# Patient Record
Sex: FEMALE | Race: WHITE | NOT HISPANIC OR LATINO | Employment: FULL TIME | URBAN - METROPOLITAN AREA
[De-identification: names, ages, dates, MRNs, and addresses within clinical notes are randomized per-mention and may not be internally consistent; named-entity substitution may affect disease eponyms.]

---

## 2017-01-19 ENCOUNTER — OFFICE VISIT (OUTPATIENT)
Dept: PEDIATRICS | Facility: CLINIC | Age: 47
End: 2017-01-19
Payer: COMMERCIAL

## 2017-01-19 VITALS
OXYGEN SATURATION: 100 % | HEART RATE: 63 BPM | SYSTOLIC BLOOD PRESSURE: 112 MMHG | DIASTOLIC BLOOD PRESSURE: 64 MMHG | WEIGHT: 154 LBS | TEMPERATURE: 96.9 F | BODY MASS INDEX: 24.17 KG/M2 | HEIGHT: 67 IN

## 2017-01-19 DIAGNOSIS — J45.990 EXERCISE-INDUCED ASTHMA: ICD-10-CM

## 2017-01-19 DIAGNOSIS — Z01.818 PREOP GENERAL PHYSICAL EXAM: Primary | ICD-10-CM

## 2017-01-19 DIAGNOSIS — D25.9 UTERINE LEIOMYOMA, UNSPECIFIED LOCATION: ICD-10-CM

## 2017-01-19 DIAGNOSIS — N92.1 EXCESSIVE, FREQUENT AND IRREGULAR MENSTRUATION: ICD-10-CM

## 2017-01-19 DIAGNOSIS — N84.0 ENDOMETRIAL POLYP: ICD-10-CM

## 2017-01-19 LAB
ERYTHROCYTE [DISTWIDTH] IN BLOOD BY AUTOMATED COUNT: 12.8 % (ref 10–15)
HCT VFR BLD AUTO: 39.9 % (ref 35–47)
HGB BLD-MCNC: 13.9 G/DL (ref 11.7–15.7)
MCH RBC QN AUTO: 31.1 PG (ref 26.5–33)
MCHC RBC AUTO-ENTMCNC: 34.8 G/DL (ref 31.5–36.5)
MCV RBC AUTO: 89 FL (ref 78–100)
PLATELET # BLD AUTO: 279 10E9/L (ref 150–450)
RBC # BLD AUTO: 4.47 10E12/L (ref 3.8–5.2)
WBC # BLD AUTO: 7.1 10E9/L (ref 4–11)

## 2017-01-19 PROCEDURE — 99214 OFFICE O/P EST MOD 30 MIN: CPT | Performed by: INTERNAL MEDICINE

## 2017-01-19 PROCEDURE — 85027 COMPLETE CBC AUTOMATED: CPT | Performed by: INTERNAL MEDICINE

## 2017-01-19 PROCEDURE — 36415 COLL VENOUS BLD VENIPUNCTURE: CPT | Performed by: INTERNAL MEDICINE

## 2017-01-19 NOTE — PROGRESS NOTES
[address] [address]  [phone]  Dept: [phone]    PRE-OP EVALUATION:  Today's date: 2017    Jessie Jimenes (: 1970) presents for pre-operative evaluation assessment as requested by Dr. English.  She requires evaluation and anesthesia risk assessment prior to undergoing surgery/procedure for treatment of Polyp, fibroid removal uterine ablation .  Proposed procedure: Polyp, fibroid removal uterine ablation    Date of Surgery/ Procedure: 2-3-17  Time of Surgery/ Procedure: 12:30pm  Hospital/Surgical Facility: [facility]  Primary Physician: Kasia Dias  Type of Anesthesia Anticipated: General    Patient has a Health Care Directive or Living Will:  yes    1. NO - Do you have a history of heart attack, stroke, stent, bypass or surgery on an artery in the head, neck, heart or legs?  2. NO - Do you ever have any pain or discomfort in your chest?  3. NO - Do you have a history of  Heart Failure?  4. NO - Are you troubled by shortness of breath when: walking on the level, up a slight hill or at night?  5. NO - Do you currently have a cold, bronchitis or other respiratory infection?  6. NO - Do you have a cough, shortness of breath or wheezing?  7. NO - Do you sometimes get pains in the calves of your legs when you walk?  8. NO - Do you or anyone in your family have previous history of blood clots?  9. NO - Do you or does anyone in your family have a serious bleeding problem such as prolonged bleeding following surgeries or cuts?  10. NO - Have you ever had problems with anemia or been told to take iron pills?  11. NO - Have you had any abnormal blood loss such as black, tarry or bloody stools, or abnormal vaginal bleeding?  12. NO - Have you ever had a blood transfusion?  13.YES- Have you or any of your relatives ever had problems with anesthesia? Nausea, vomiting. Typically needs scopolamine.   14. NO - Do you have sleep apnea, excessive  snoring or daytime drowsiness?  15. NO - Do you have any prosthetic heart valves?  16. NO - Do you have prosthetic joints?  17. NO - Is there any chance that you may be pregnant?      HPI:                                                      Brief HPI related to upcoming procedure: endometrial polyp removal, uterine fibroid removal and endometrial ablation.       See problem list for active medical problems.  Problems all longstanding and stable, except as noted/documented.  See ROS for pertinent symptoms related to these conditions.                                                                                                  .    MEDICAL HISTORY:                                                      Patient Active Problem List    Diagnosis Date Noted     Excessive, frequent and irregular menstruation 12/07/2016     Priority: Medium     Submucous leiomyoma of uterus 12/07/2016     Priority: Medium     Endometrial polyp 12/07/2016     Priority: Medium     Dense breast 05/20/2015     Annual mammogram       Tear of meniscus of knee 03/12/2015     Other postprocedural status(V45.89) 03/12/2015     Knee pain 12/19/2014     Pain in joint, pelvic region and thigh 12/19/2014     Pain of left calf 12/19/2014     Exercise-induced asthma 01/16/2014     Consultation for sterilization 12/18/2013     Contraception management 10/14/2013     ASCUS with positive high risk HPV 10/31/2012     10/22/12 pap- ASCUS neg HR HPV (70). Plan-- colposcopy within 3 months  12/19/12 colposcopy. No bx taken. Plan-- Repeat Pap in 1 year.  3/21/14 pap LSIL/neg HR HPV. Plan: repeat cotesting in 1 year. (due 3/21/15)   5/20/15 pap NIL/neg HPV. Plan: repeat cotesting in 3 years. Due 5/20/16.          TMJ (temporomandibular joint syndrome) 10/24/2012     CARDIOVASCULAR SCREENING; LDL GOAL LESS THAN 160 01/18/2011      Past Medical History   Diagnosis Date     ASCUS favoring benign 10/22/12     neg HR HPV (70)     H/O colposcopy with cervical biopsy  12/19/12     no bx taken.      IUD (intrauterine device) in place 10/24/2012     Exercise-induced asthma 1/16/2014     LSIL (low grade squamous intraepithelial lesion) on Pap smear 3/21/14     neg HPV     PONV (postoperative nausea and vomiting)      Past Surgical History   Procedure Laterality Date     Hernia repair  2/5/07     C hand/finger surgery unlisted       Colposcopy cervix, biopsy cervix, endocervical curettage, combined       Hysteroscopic placement contraceptive device  2/7/2014     Procedure: HYSTEROSCOPIC TUBAL LIGATION;  Hysteroscopic tubal ligation with Essure;  Surgeon: Marcos Sierra MD;  Location:  OR     Arthroscopy knee Left 3/3/2015     Procedure: ARTHROSCOPY KNEE;  Surgeon: Marcos Shen MD;  Location: [facility]     Current Outpatient Prescriptions   Medication Sig Dispense Refill     Multiple Vitamin (MULTIVITAMIN) per tablet Take 1 tablet by mouth daily.       PARoxetine (PAXIL) 10 MG tablet 1 tablet daily for 10 days each months before menses 30 tablet 3     albuterol (PROAIR HFA, PROVENTIL HFA, VENTOLIN HFA) 108 (90 BASE) MCG/ACT inhaler Inhale 2 puffs into the lungs every 6 hours as needed for shortness of breath / dyspnea 1 Inhaler 5     Probiotic Product (PROBIOTIC DAILY PO) Take by mouth daily       Calcium Carbonate-Vitamin D (CALCIUM + D PO) Take by mouth daily       acetaminophen (TYLENOL) 325 MG tablet Take 1-2 tablets (325-650 mg) by mouth every 4 hours as needed for mild pain 100 tablet 0     fish oil-omega-3 fatty acids (FISH OIL) 1000 MG capsule Take 2 g by mouth daily.       ibuprofen (ADVIL,MOTRIN) 200 MG tablet Take 800 mg by mouth four times a week.       OTC products: None, except as noted above    No Known Allergies   Latex Allergy: NO    Social History   Substance Use Topics     Smoking status: Former Smoker -- 0.50 packs/day     Types: Cigarettes     Quit date: 01/18/1998     Smokeless tobacco: Never Used     Alcohol Use: Yes      Comment: wine 6-7  "glasses  per week     History   Drug Use No       REVIEW OF SYSTEMS:                                                    C: NEGATIVE for fever, chills, change in weight  E/M: NEGATIVE for ear, mouth and throat problems  R: NEGATIVE for significant cough or SOB  CV: NEGATIVE for chest pain, palpitations or peripheral edema  GI: NEGATIVE for nausea, abdominal pain, heartburn, or change in bowel habits  MUSCULOSKELETAL: NEGATIVE for significant arthralgias or myalgia  : heavy menses.     EXAM:                                                    /64 mmHg  Pulse 63  Temp(Src) 96.9  F (36.1  C) (Temporal)  Ht 5' 6.75\" (1.695 m)  Wt 154 lb (69.854 kg)  BMI 24.31 kg/m2  SpO2 100%  GENERAL APPEARANCE: healthy, alert and no distress  HENT: ear canals and TM's normal and nose and mouth without ulcers or lesions  RESP: lungs clear to auscultation - no rales, rhonchi or wheezes  CV: regular rate and rhythm, normal S1 S2, no S3 or S4 and no murmur, click or rub   ABDOMEN: soft, nontender, no HSM or masses and bowel sounds normal  NEURO: Normal strength and tone, sensory exam grossly normal, mentation intact and speech normal    DIAGNOSTICS:                                                    EKG: appears normal, NSR, unchanged from previous tracings    Recent Labs   Lab Test  10/19/16   0520  01/28/16   1244  02/26/15   1436   HGB   --   14.2  13.5   PLT   --   244  284   NA   --   141   --    POTASSIUM   --   3.9   --    CR  64  0.81   --         IMPRESSION:                                                    Reason for surgery/procedure: endometrial polyp, uterine fibroid.   Diagnosis/reason for consult:     The proposed surgical procedure is considered LOW risk.    REVISED CARDIAC RISK INDEX  The patient has the following serious cardiovascular risks for perioperative complications such as (MI, PE, VFib and 3  AV Block):  No serious cardiac risks  INTERPRETATION: 0 risks: Class I (very low risk - 0.4% complication " rate)    The patient has the following additional risks for perioperative complications:  No identified additional risks      RECOMMENDATIONS:                                                        APPROVAL GIVEN to proceed with proposed procedure, without further diagnostic evaluation       Signed Electronically by: Kasia Dias MD    Copy of this evaluation report is provided to requesting physician.    [facility] Preop Guidelines

## 2017-01-19 NOTE — PATIENT INSTRUCTIONS
Get labs done today.     Before Your Surgery      Call your surgeon if there is any change in your health. This includes signs of a cold or flu (such as a sore throat, runny nose, cough, rash or fever).    Do not smoke, drink alcohol or take over the counter medicine (unless your surgeon or primary care doctor tells you to) for the 24 hours before and after surgery.    If you take prescribed drugs: Follow your doctor s orders about which medicines to take and which to stop until after surgery.    Eating and drinking prior to surgery: follow the instructions from your surgeon    Take a shower or bath the night before surgery. Use the soap your surgeon gave you to gently clean your skin. If you do not have soap from your surgeon, use your regular soap. Do not shave or scrub the surgery site.  Wear clean pajamas and have clean sheets on your bed.

## 2017-01-19 NOTE — NURSING NOTE
"Chief Complaint   Patient presents with     Pre-Op Exam       Initial /64 mmHg  Pulse 63  Temp(Src) 96.9  F (36.1  C) (Temporal)  Ht 5' 6.75\" (1.695 m)  Wt 154 lb (69.854 kg)  BMI 24.31 kg/m2  SpO2 100% Estimated body mass index is 24.31 kg/(m^2) as calculated from the following:    Height as of this encounter: 5' 6.75\" (1.695 m).    Weight as of this encounter: 154 lb (69.854 kg).  BP completed using cuff size: regular  .,kn      "

## 2017-01-19 NOTE — MR AVS SNAPSHOT
After Visit Summary   1/19/2017    Jessie Jimenes    MRN: [id number]           Patient Information     Date Of Birth          1970        Visit Information        Provider Department      1/19/2017 4:40 PM Kasia Dias MD [facility]        Today's Diagnoses     Preop general physical exam    -  1     Endometrial polyp         Uterine leiomyoma, unspecified location         Excessive, frequent and irregular menstruation         Exercise-induced asthma           Care Instructions    Get labs done today.     Before Your Surgery      Call your surgeon if there is any change in your health. This includes signs of a cold or flu (such as a sore throat, runny nose, cough, rash or fever).    Do not smoke, drink alcohol or take over the counter medicine (unless your surgeon or primary care doctor tells you to) for the 24 hours before and after surgery.    If you take prescribed drugs: Follow your doctor s orders about which medicines to take and which to stop until after surgery.    Eating and drinking prior to surgery: follow the instructions from your surgeon    Take a shower or bath the night before surgery. Use the soap your surgeon gave you to gently clean your skin. If you do not have soap from your surgeon, use your regular soap. Do not shave or scrub the surgery site.  Wear clean pajamas and have clean sheets on your bed.         Follow-ups after your visit        Your next 10 appointments already scheduled     Feb 03, 2017   Procedure with Marcos Sierra MD   [facility] (--)    [address] [place] [facility] [address] [phone]              Who to contact     If you have questions or need follow up information about today's clinic visit or your schedule please contact [facility] directly at [phone].  Normal or non-critical lab and imaging results will be communicated to you by MyChart, letter or phone within 4 business days after  "the clinic has received the results. If you do not hear from us within 7 days, please contact the clinic through [facility] or phone. If you have a critical or abnormal lab result, we will notify you by phone as soon as possible.  Submit refill requests through [facility] or call your pharmacy and they will forward the refill request to us. Please allow 3 business days for your refill to be completed.          Additional Information About Your Visit        [facility] Information     [facility] gives you secure access to your electronic health record. If you see a primary care provider, you can also send messages to your care team and make appointments. If you have questions, please call your primary care clinic.  If you do not have a primary care provider, please call [phone] and they will assist you.      [facility] is an electronic gateway that provides easy, online access to your medical records. With [facility], you can request a clinic appointment, read your test results, renew a prescription or communicate with your care team.     To access your existing account, please contact your [facility] or call [phone] for assistance.        Care EveryWhere ID     This is your Care EveryWhere ID. This could be used by other organizations to access your [facility] medical records  [id number]        Your Vitals Were     Pulse Temperature Height BMI (Body Mass Index) Pulse Oximetry       63 96.9  F (36.1  C) (Temporal) 5' 6.75\" (1.695 m) 24.31 kg/m2 100%        Blood Pressure from Last 3 Encounters:   01/19/17 112/64   12/07/16 124/78   09/15/16 110/66    Weight from Last 3 Encounters:   01/19/17 154 lb (69.854 kg)   12/07/16 158 lb 3.2 oz (71.759 kg)   09/15/16 153 lb (69.4 kg)              We Performed the Following     CBC with platelets        Primary Care Provider Office Phone # Fax #    Kasia Dias [phone]       [facility] [address]  [facility] [address]      "   Thank you!     Thank you for choosing [facility]  for your care. Our goal is always to provide you with excellent care. Hearing back from our patients is one way we can continue to improve our services. Please take a few minutes to complete the written survey that you may receive in the mail after your visit with us. Thank you!             Your Updated Medication List - Protect others around you: Learn how to safely use, store and throw away your medicines at www.disposemymeds.org.          This list is accurate as of: 1/19/17  4:57 PM.  Always use your most recent med list.                   Brand Name Dispense Instructions for use    acetaminophen 325 MG tablet    TYLENOL    100 tablet    Take 1-2 tablets (325-650 mg) by mouth every 4 hours as needed for mild pain       albuterol 108 (90 BASE) MCG/ACT Inhaler    PROAIR HFA/PROVENTIL HFA/VENTOLIN HFA    1 Inhaler    Inhale 2 puffs into the lungs every 6 hours as needed for shortness of breath / dyspnea       CALCIUM + D PO      Take by mouth daily       fish oil-omega-3 fatty acids 1000 MG capsule      Take 2 g by mouth daily.       ibuprofen 200 MG tablet    ADVIL/MOTRIN     Take 800 mg by mouth four times a week.       multivitamin per tablet      Take 1 tablet by mouth daily.       PARoxetine 10 MG tablet    PAXIL    30 tablet    1 tablet daily for 10 days each months before menses       PROBIOTIC DAILY PO      Take by mouth daily

## 2017-01-19 NOTE — PROGRESS NOTES
Quick Note:    Dear Jessie,   Here are your recent results which are within the expected range. Please continue with your current plan of care.   Please call or Mychart to our office if you have further questions.     Kasia Dias MD  ______

## 2017-02-03 ENCOUNTER — ANESTHESIA (OUTPATIENT)
Dept: SURGERY | Facility: AMBULATORY SURGERY CENTER | Age: 47
End: 2017-02-03
Payer: COMMERCIAL

## 2017-02-03 ENCOUNTER — ANESTHESIA EVENT (OUTPATIENT)
Dept: SURGERY | Facility: AMBULATORY SURGERY CENTER | Age: 47
End: 2017-02-03
Payer: COMMERCIAL

## 2017-02-03 ENCOUNTER — RADIANT APPOINTMENT (OUTPATIENT)
Dept: MAMMOGRAPHY | Facility: CLINIC | Age: 47
End: 2017-02-03
Attending: INTERNAL MEDICINE
Payer: COMMERCIAL

## 2017-02-03 DIAGNOSIS — Z12.31 VISIT FOR SCREENING MAMMOGRAM: ICD-10-CM

## 2017-02-03 PROCEDURE — G0202 SCR MAMMO BI INCL CAD: HCPCS | Performed by: RADIOLOGY

## 2017-02-03 PROCEDURE — 77063 BREAST TOMOSYNTHESIS BI: CPT | Performed by: RADIOLOGY

## 2017-02-03 RX ORDER — PROPOFOL 10 MG/ML
INJECTION, EMULSION INTRAVENOUS CONTINUOUS PRN
Status: DISCONTINUED | OUTPATIENT
Start: 2017-02-03 | End: 2017-02-03

## 2017-02-03 RX ORDER — ONDANSETRON 2 MG/ML
INJECTION INTRAMUSCULAR; INTRAVENOUS PRN
Status: DISCONTINUED | OUTPATIENT
Start: 2017-02-03 | End: 2017-02-03

## 2017-02-03 RX ORDER — LIDOCAINE HYDROCHLORIDE 20 MG/ML
INJECTION, SOLUTION INFILTRATION; PERINEURAL PRN
Status: DISCONTINUED | OUTPATIENT
Start: 2017-02-03 | End: 2017-02-03

## 2017-02-03 RX ORDER — DEXAMETHASONE SODIUM PHOSPHATE 4 MG/ML
INJECTION, SOLUTION INTRA-ARTICULAR; INTRALESIONAL; INTRAMUSCULAR; INTRAVENOUS; SOFT TISSUE PRN
Status: DISCONTINUED | OUTPATIENT
Start: 2017-02-03 | End: 2017-02-03

## 2017-02-03 RX ORDER — PROPOFOL 10 MG/ML
INJECTION, EMULSION INTRAVENOUS PRN
Status: DISCONTINUED | OUTPATIENT
Start: 2017-02-03 | End: 2017-02-03

## 2017-02-03 RX ADMIN — SODIUM CHLORIDE, SODIUM LACTATE, POTASSIUM CHLORIDE, CALCIUM CHLORIDE: 600; 310; 30; 20 INJECTION, SOLUTION INTRAVENOUS at 12:04

## 2017-02-03 RX ADMIN — PROPOFOL 50 MG: 10 INJECTION, EMULSION INTRAVENOUS at 12:26

## 2017-02-03 RX ADMIN — ONDANSETRON 4 MG: 2 INJECTION INTRAMUSCULAR; INTRAVENOUS at 12:37

## 2017-02-03 RX ADMIN — PROPOFOL 200 MCG/KG/MIN: 10 INJECTION, EMULSION INTRAVENOUS at 12:25

## 2017-02-03 RX ADMIN — PROPOFOL 30 MG: 10 INJECTION, EMULSION INTRAVENOUS at 12:28

## 2017-02-03 RX ADMIN — KETOROLAC TROMETHAMINE 30 MG: 30 INJECTION, SOLUTION INTRAMUSCULAR; INTRAVENOUS at 12:37

## 2017-02-03 RX ADMIN — PROPOFOL 20 MG: 10 INJECTION, EMULSION INTRAVENOUS at 12:30

## 2017-02-03 RX ADMIN — LIDOCAINE HYDROCHLORIDE 40 MG: 20 INJECTION, SOLUTION INFILTRATION; PERINEURAL at 12:26

## 2017-02-03 RX ADMIN — DEXAMETHASONE SODIUM PHOSPHATE 8 MG: 4 INJECTION, SOLUTION INTRA-ARTICULAR; INTRALESIONAL; INTRAMUSCULAR; INTRAVENOUS; SOFT TISSUE at 12:29

## 2017-02-03 NOTE — ANESTHESIA CARE TRANSFER NOTE
Patient: Jessie Candelario    Procedure(s):  Diagnostic hysteroscopy with Myosure, possible polypectomy, possible myomectomy, endometrial ablation with Novasure - Wound Class: II-Clean Contaminated   - Wound Class: II-Clean Contaminated    Diagnosis: excessive, frequent, irregular menstruation, submucous leiomyoma of uterus, endometrial polyp  Diagnosis Additional Information: No value filed.    Anesthesia Type:   MAC     Note:  Airway :Room Air  Patient transferred to:Phase II  Comments: Patient awake, alert and oriented.  Moderate cramping reported, otherwise, no discomfort.  No apparent anesthesia complications.  Requested RN to place warm pack to right antecubital.       Vitals: (Last set prior to Anesthesia Care Transfer)    CRNA VITALS  2/3/2017 1236 - 2/3/2017 1312      2/3/2017             Pulse: 60    SpO2: 100 %                Electronically Signed By: BASSEM Manuel CRNA  February 3, 2017  1:12 PM

## 2017-02-03 NOTE — ANESTHESIA POSTPROCEDURE EVALUATION
Patient: Jessie Candelario    Procedure(s):  Diagnostic hysteroscopy with Myosure and polypectomy, myomectomy, endometrial ablation with Novasure - Wound Class: II-Clean Contaminated   - Wound Class: II-Clean Contaminated    Diagnosis:excessive, frequent, irregular menstruation, submucous leiomyoma of uterus, endometrial polyp  Diagnosis Additional Information: No value filed.    Anesthesia Type:  MAC    Note:  Anesthesia Post Evaluation    Patient location during evaluation: PACU  Patient participation: Able to fully participate in evaluation  Level of consciousness: awake  Pain management: adequate  Airway patency: patent  Cardiovascular status: acceptable  Respiratory status: acceptable  Hydration status: balanced  PONV: none     Anesthetic complications: None          Last vitals:  Filed Vitals:    02/03/17 1146 02/03/17 1306 02/03/17 1320   BP: 140/60 127/65 126/73   Temp: 98.9  F (37.2  C)     Resp: 16 16 16   SpO2: 100% 100% 100%         Electronically Signed By: Parrish Newell MD  February 3, 2017  1:31 PM

## 2017-02-03 NOTE — ANESTHESIA PREPROCEDURE EVALUATION
Anesthesia Evaluation     .        ROS/MED HX    ENT/Pulmonary:  - neg pulmonary ROS     Neurologic:       Cardiovascular:  - neg cardiovascular ROS       METS/Exercise Tolerance:     Hematologic:         Musculoskeletal:         GI/Hepatic:         Renal/Genitourinary:         Endo:         Psychiatric:         Infectious Disease:         Malignancy:         Other:               Physical Exam  Normal systems: pulmonary and dental    Airway   Mallampati: II  TM distance: >3 FB  Neck ROM: full    Dental     Cardiovascular   Rhythm and rate: regular and normal      Pulmonary                     Anesthesia Plan      History & Physical Review  History and physical reviewed and following examination; no interval change.    ASA Status:  2 .    NPO Status:  > 8 hours    Plan for MAC          Postoperative Care      Consents                          .

## 2017-02-14 ENCOUNTER — MYC MEDICAL ADVICE (OUTPATIENT)
Dept: OBGYN | Facility: CLINIC | Age: 47
End: 2017-02-14

## 2017-02-15 NOTE — TELEPHONE ENCOUNTER
Noted pt had surgery on 02-03-17 by Dr. Sierra   Diagnostic Hysteroscopy with Myosure and Fibroidectomy and endometrial ablation with Novasure    Reviewed hysteroscopy and endo ablation handouts from [facility].   Vani Monroy RN, [facility]

## 2017-10-02 ENCOUNTER — MYC MEDICAL ADVICE (OUTPATIENT)
Dept: PEDIATRICS | Facility: CLINIC | Age: 47
End: 2017-10-02

## 2017-10-02 NOTE — TELEPHONE ENCOUNTER
Called patient, gave message per Dr. Dias,  scheduled with Dr. Dias on 10/5/17.    Gabrielle Butler RN, [facility]

## 2017-10-02 NOTE — TELEPHONE ENCOUNTER
Routed [employer] message to PCP    I was due to have a year follow up chest CT in 2/2017 from the 2/2016 CT that showed a small pulmonary nodule, and I'd like to start a sleeping medication, not ambien since I've started working night shifts. Do I need to make an appt for these orders?    Valeri Maddox RN,   [facility]

## 2017-10-05 ENCOUNTER — OFFICE VISIT (OUTPATIENT)
Dept: PEDIATRICS | Facility: CLINIC | Age: 47
End: 2017-10-05
Payer: COMMERCIAL

## 2017-10-05 VITALS
DIASTOLIC BLOOD PRESSURE: 70 MMHG | BODY MASS INDEX: 24.16 KG/M2 | TEMPERATURE: 97.4 F | SYSTOLIC BLOOD PRESSURE: 100 MMHG | HEART RATE: 53 BPM | OXYGEN SATURATION: 100 % | WEIGHT: 153 LBS

## 2017-10-05 DIAGNOSIS — R91.1 PULMONARY NODULE: Primary | ICD-10-CM

## 2017-10-05 DIAGNOSIS — G47.09 OTHER INSOMNIA: ICD-10-CM

## 2017-10-05 PROCEDURE — 99213 OFFICE O/P EST LOW 20 MIN: CPT | Performed by: INTERNAL MEDICINE

## 2017-10-05 RX ORDER — ZOLPIDEM TARTRATE 5 MG/1
5 TABLET ORAL
Qty: 30 TABLET | Refills: 1 | Status: SHIPPED | OUTPATIENT
Start: 2017-10-05 | End: 2019-02-21

## 2017-10-05 NOTE — NURSING NOTE
"Chief Complaint   Patient presents with     Sleep Problem     Having trouble sleeping, work shift has changed and unable to sleep past 4 hours.         Initial /70  Pulse 53  Temp 97.4  F (36.3  C) (Temporal)  Wt 153 lb (69.4 kg)  SpO2 100%  BMI 24.16 kg/m2 Estimated body mass index is 24.16 kg/(m^2) as calculated from the following:    Height as of 1/30/17: 5' 6.73\" (1.695 m).    Weight as of this encounter: 153 lb (69.4 kg).  Medication Reconciliation: complete    "

## 2017-10-05 NOTE — MR AVS SNAPSHOT
After Visit Summary   10/5/2017    Jessie Candelario    MRN: [id number]           Patient Information     Date Of Birth          1970        Visit Information        Provider Department      10/5/2017 5:10 PM Kasia Dias MD PhD [facility]        Today's Diagnoses     Pulmonary nodule    -  1    Other insomnia          Care Instructions    Make appointment(s) for:   -- chest CT: call [phone]            Follow-ups after your visit        Future tests that were ordered for you today     Open Future Orders        Priority Expected Expires Ordered    CT Chest Low Dose Non Contrast Routine  10/5/2018 10/5/2017            Who to contact     If you have questions or need follow up information about today's clinic visit or your schedule please contact [facility] directly at [phone].  Normal or non-critical lab and imaging results will be communicated to you by PayrollHerohart, letter or phone within 4 business days after the clinic has received the results. If you do not hear from us within 7 days, please contact the clinic through PayrollHerohart or phone. If you have a critical or abnormal lab result, we will notify you by phone as soon as possible.  Submit refill requests through [employer]ber or call your pharmacy and they will forward the refill request to us. Please allow 3 business days for your refill to be completed.          Additional Information About Your Visit        PayrollHerohart Information     EcoTimber gives you secure access to your electronic health record. If you see a primary care provider, you can also send messages to your care team and make appointments. If you have questions, please call your primary care clinic.  If you do not have a primary care provider, please call [phone] and they will assist you.      EcoTimber is an electronic gateway that provides easy, online access to your medical records. With [employer]ber, you can request a clinic appointment, read your  test results, renew a prescription or communicate with your care team.     To access your existing account, please contact your [facility] or call [phone] for assistance.        Care EveryWhere ID     This is your Care EveryWhere ID. This could be used by other organizations to access your [facility] medical records  [id number]        Your Vitals Were     Pulse Temperature Pulse Oximetry BMI (Body Mass Index)          53 97.4  F (36.3  C) (Temporal) 100% 24.16 kg/m2         Blood Pressure from Last 3 Encounters:   10/05/17 100/70   02/03/17 131/67   01/19/17 112/64    Weight from Last 3 Encounters:   10/05/17 153 lb (69.4 kg)   01/30/17 153 lb 15.9 oz (69.8 kg)   01/19/17 154 lb (69.9 kg)              We Performed the Following     Asthma Action Plan (AAP)          Today's Medication Changes          These changes are accurate as of: 10/5/17  5:41 PM.  If you have any questions, ask your nurse or doctor.               Start taking these medicines.        Dose/Directions    zolpidem 5 MG tablet   Commonly known as:  AMBIEN   Used for:  Other insomnia   Started by:  Kasia Dias MD PhD        Dose:  5 mg   Take 1 tablet (5 mg) by mouth nightly as needed for sleep   Quantity:  30 tablet   Refills:  1            Where to get your medicines      Some of these will need a paper prescription and others can be bought over the counter.  Ask your nurse if you have questions.     Bring a paper prescription for each of these medications     zolpidem 5 MG tablet                Primary Care Provider Office Phone # Fax #    Kasia Dias MD PhD [phone]       [address]  [facility] [address]        Equal Access to Services     CLEVE LYNN : Hadsonny Ortez, watinada lukash, qaybta kaalami barr. So [facility] [phone].    ATENCIÓN: Si habla español, tiene a lim disposición servicios gratuitos de asistencia lingüística.  Tavo lópez [phone].    We comply with applicable federal civil rights laws and [place] laws. We do not discriminate on the basis of race, color, national origin, age, disability, sex, sexual orientation, or gender identity.            Thank you!     Thank you for choosing [facility]  for your care. Our goal is always to provide you with excellent care. Hearing back from our patients is one way we can continue to improve our services. Please take a few minutes to complete the written survey that you may receive in the mail after your visit with us. Thank you!             Your Updated Medication List - Protect others around you: Learn how to safely use, store and throw away your medicines at www.disposemymeds.org.          This list is accurate as of: 10/5/17  5:41 PM.  Always use your most recent med list.                   Brand Name Dispense Instructions for use Diagnosis    acetaminophen 325 MG tablet    TYLENOL    100 tablet    Take 1-2 tablets (325-650 mg) by mouth every 4 hours as needed for mild pain    Knee pain, left       CALCIUM + D PO      Take by mouth daily        ibuprofen 200 MG tablet    ADVIL/MOTRIN     Take 800 mg by mouth four times a week.        UNABLE TO FIND      MEDICATION NAME: Vitalreds        VENTOLIN  (90 BASE) MCG/ACT Inhaler   Generic drug:  albuterol     18 Inhaler    INHALE 2 PUFFS INTO THE LUNGS EVERY 6 HOURS AS NEEDED FOR SHORTNESS OF BREATH / DYSPNEA    Exercise-induced asthma       zolpidem 5 MG tablet    AMBIEN    30 tablet    Take 1 tablet (5 mg) by mouth nightly as needed for sleep    Other insomnia

## 2017-10-05 NOTE — PROGRESS NOTES
SUBJECTIVE:   Jessie Candelario is a 47 year old female who presents to clinic today for the following health issues:      Having trouble sleeping, work shift has changed and unable to sleep past 4 hours.    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 Has trouble sleeping. In the past, ambien has helped. She is hoping to be able to get to day shift soon. Needs some ambien to help her transition. No side effects in the past.     History of nodules on CT, was to be rechecked in the spring but has not done so. Denies any symptoms. Exercise induced asthma, stable.       Current Outpatient Prescriptions on File Prior to Visit:  VENTOLIN  (90 BASE) MCG/ACT Inhaler INHALE 2 PUFFS INTO THE LUNGS EVERY 6 HOURS AS NEEDED FOR SHORTNESS OF BREATH / DYSPNEA   Calcium Carbonate-Vitamin D (CALCIUM + D PO) Take by mouth daily   acetaminophen (TYLENOL) 325 MG tablet Take 1-2 tablets (325-650 mg) by mouth every 4 hours as needed for mild pain   ibuprofen (ADVIL,MOTRIN) 200 MG tablet Take 800 mg by mouth four times a week.     No current facility-administered medications on file prior to visit.       Problem list, Medication list, Allergies, and Medical/Social/Surgical histories reviewed in [employer] and updated as appropriate.    OBJECTIVE:                                                    /70  Pulse 53  Temp 97.4  F (36.3  C) (Temporal)  Wt 153 lb (69.4 kg)  SpO2 100%  BMI 24.16 kg/m2    GEN: healthy, alert and no distress       ASSESSMENT/PLAN:                                                      47 year old female with the following diagnoses and treatment plan:      ICD-10-CM    1. Pulmonary nodule R91.1 CT Chest Low Dose Non Contrast   2. Other insomnia G47.09 zolpidem (AMBIEN) 5 MG tablet       -- CT ordered and ambien given.     Will call or return to clinic if worsening or symptoms not improving as discussed.  See Patient Instructions.        Kasia Dias  MD-PhD  [facility]    (Note: Chart documentation was done in part with Dragon Voice Recognition software. Although reviewed after completion, some word and grammatical errors may remain.)

## 2017-10-05 NOTE — LETTER
My Asthma Action Plan  Name: Jessie Candelario   YOB: 1970  Date: 10/5/2017   My doctor: Kasia Dias MD PhD   My clinic: [facility]        My Control Medicine: { :318107}  My Rescue Medicine: { :298823}  {AAP include Oral Steroid:503703} My Asthma Severity: { :454160}  Avoid your asthma triggers: { :369612}        {Is patient a child or adult?:221879}       GREEN ZONE   Good Control    I feel good    No cough or wheeze    Can work, sleep and play without asthma symptoms       Take your asthma control medicine every day.     1. If exercise triggers your asthma, take your rescue medication    15 minutes before exercise or sports, and    During exercise if you have asthma symptoms  2. Spacer to use with inhaler: If you have a spacer, make sure to use it with your inhaler             YELLOW ZONE Getting Worse  I have ANY of these:    I do not feel good    Cough or wheeze    Chest feels tight    Wake up at night   1. Keep taking your Green Zone medications  2. Start taking your rescue medicine:    every 20 minutes for up to 1 hour. Then every 4 hours for 24-48 hours.  3. If you stay in the Yellow Zone for more than 12-24 hours, contact your doctor.  4. If you do not return to the Green Zone in 12-24 hours or you get worse, start taking your oral steroid medicine if prescribed by your provider.           RED ZONE Medical Alert - Get Help  I have ANY of these:    I feel awful    Medicine is not helping    Breathing getting harder    Trouble walking or talking    Nose opens wide to breathe       1. Take your rescue medicine NOW  2. If your provider has prescribed an oral steroid medicine, start taking it NOW  3. Call your doctor NOW  4. If you are still in the Red Zone after 20 minutes and you have not reached your doctor:    Take your rescue medicine again and    Call 911 or go to the emergency room right away    See your regular doctor within 2 weeks of an Emergency Room or Urgent Care  visit for follow-up treatment.        Electronically signed by: Sabrina Dela Cruz, October 5, 2017    Annual Reminders:  Meet with Asthma Educator,  Flu Shot in the Fall, consider Pneumonia Vaccination for patients with asthma (aged 19 and older).    Pharmacy:    [id number] - [place], [place] - [address]  [facility] [place] [address], [address]                    Asthma Triggers  How To Control Things That Make Your Asthma Worse    Triggers are things that make your asthma worse.  Look at the list below to help you find your triggers and what you can do about them.  You can help prevent asthma flare-ups by staying away from your triggers.      Trigger                                                          What you can do   Cigarette Smoke  Tobacco smoke can make asthma worse. Do not allow smoking in your home, car or around you.  Be sure no one smokes at a child s day care or school.  If you smoke, ask your health care provider for ways to help you quit.  Ask family members to quit too.  Ask your health care provider for a referral to Quit Plan to help you quit smoking, or call 8-003-756-PLAN.     Colds, Flu, Bronchitis  These are common triggers of asthma. Wash your hands often.  Don t touch your eyes, nose or mouth.  Get a flu shot every year.     Dust Mites  These are tiny bugs that live in cloth or carpet. They are too small to see. Wash sheets and blankets in hot water every week.   Encase pillows and mattress in dust mite proof covers.  Avoid having carpet if you can. If you have carpet, vacuum weekly.   Use a dust mask and HEPA vacuum.   Pollen and Outdoor Mold  Some people are allergic to trees, grass, or weed pollen, or molds. Try to keep your windows closed.  Limit time out doors when pollen count is high.   Ask you health care provider about taking medicine during allergy season.     Animal Dander  Some people are allergic to skin flakes, urine  or saliva from pets with fur or feathers. Keep pets with fur or feathers out of your home.    If you can t keep the pet outdoors, then keep the pet out of your bedroom.  Keep the bedroom door closed.  Keep pets off cloth furniture and away from stuffed toys.     Mice, Rats, and Cockroaches  Some people are allergic to the waste from these pests.   Cover food and garbage.  Clean up spills and food crumbs.  Store grease in the refrigerator.   Keep food out of the bedroom.   Indoor Mold  This can be a trigger if your home has high moisture. Fix leaking faucets, pipes, or other sources of water.   Clean moldy surfaces.  Dehumidify basement if it is damp and smelly.   Smoke, Strong Odors, and Sprays  These can reduce air quality. Stay away from strong odors and sprays, such as perfume, powder, hair spray, paints, smoke incense, paint, cleaning products, candles and new carpet.   Exercise or Sports  Some people with asthma have this trigger. Be active!  Ask your doctor about taking medicine before sports or exercise to prevent symptoms.    Warm up for 5-10 minutes before and after sports or exercise.     Other Triggers of Asthma  Cold air:  Cover your nose and mouth with a scarf.  Sometimes laughing or crying can be a trigger.  Some medicines and food can trigger asthma.

## 2017-10-07 ASSESSMENT — ASTHMA QUESTIONNAIRES: ACT_TOTALSCORE: 23

## 2017-10-17 ENCOUNTER — RADIANT APPOINTMENT (OUTPATIENT)
Dept: CT IMAGING | Facility: CLINIC | Age: 47
End: 2017-10-17
Attending: INTERNAL MEDICINE
Payer: COMMERCIAL

## 2017-10-17 DIAGNOSIS — R91.1 PULMONARY NODULE: ICD-10-CM

## 2017-10-17 PROCEDURE — 71250 CT THORAX DX C-: CPT | Performed by: RADIOLOGY

## 2017-10-18 NOTE — PROGRESS NOTES
Dear Jessie,   Here are your recent results.   -- lung nodule is 5 mm. Possibly 1 mm bigger than the previous one vs technique related difference. Recheck in 1 year.  -- the CT commented on left adrenal nodule. Based on the size, it is smaller than on the abdominal CT done a year ago. So at this point, I do not recommend additional testing. We'll see what this looks like next CT on the chest CT    Please call or Mychart to our office if you have further questions.     Kasia Dias MD-PhD

## 2018-02-23 ENCOUNTER — RADIANT APPOINTMENT (OUTPATIENT)
Dept: MAMMOGRAPHY | Facility: CLINIC | Age: 48
End: 2018-02-23
Attending: INTERNAL MEDICINE
Payer: COMMERCIAL

## 2018-02-23 DIAGNOSIS — Z12.31 SCREENING MAMMOGRAM, ENCOUNTER FOR: ICD-10-CM

## 2018-02-23 PROCEDURE — 77063 BREAST TOMOSYNTHESIS BI: CPT | Performed by: RADIOLOGY

## 2018-02-23 PROCEDURE — 77067 SCR MAMMO BI INCL CAD: CPT | Performed by: RADIOLOGY

## 2018-07-21 ENCOUNTER — HEALTH MAINTENANCE LETTER (OUTPATIENT)
Age: 48
End: 2018-07-21

## 2018-10-15 DIAGNOSIS — J45.990 EXERCISE-INDUCED ASTHMA: ICD-10-CM

## 2018-10-15 RX ORDER — ALBUTEROL SULFATE 90 UG/1
AEROSOL, METERED RESPIRATORY (INHALATION)
Qty: 6.7 INHALER | Refills: 0 | Status: SHIPPED | OUTPATIENT
Start: 2018-10-15 | End: 2019-02-21

## 2018-10-15 NOTE — LETTER
October 15, 2018      Jessie Candelario  [address]   [place] [address]        Dear Jessie,     We recently received a refill request from your pharmacy requesting a refill of :   Ventolin inhaler      A review of your chart indicates that your last office visit was on 10/5/17.  We have authorized one time 30 day refill of your medication to allow time for you to schedule.     Please call the clinic at [phone], or log in to your [employer] account at [email or web address] to schedule your appointment within that time frame so there is no delay in next month's refill.    Thank you for taking an active role in your healthcare.    Sincerely,    Valeri GILL RN,   [facility]    If you need assistance with your K[employer]ik[employer] log in, please call [phone].

## 2018-10-15 NOTE — TELEPHONE ENCOUNTER
Isabell Refill    ventolin filled for 30 days.    Patient is due for: annual physical    Future Appointments scheduled:  none    Follow up with patient:  Reminder letter sent to patient.    Valeri Maddox RN,   [facility]        Asthma Maintenance Inhalers - Anticholinergics Jnhlce00/15 7:23 AM   Asthma control assessment score within normal limits in last 6 months    Recent (6 mo) or future (30 days) visit within the authorizing provider's specialty

## 2019-02-21 ENCOUNTER — OFFICE VISIT (OUTPATIENT)
Dept: PEDIATRICS | Facility: CLINIC | Age: 49
End: 2019-02-21
Payer: COMMERCIAL

## 2019-02-21 VITALS
WEIGHT: 149.6 LBS | SYSTOLIC BLOOD PRESSURE: 122 MMHG | HEIGHT: 67 IN | DIASTOLIC BLOOD PRESSURE: 68 MMHG | BODY MASS INDEX: 23.48 KG/M2 | HEART RATE: 54 BPM | RESPIRATION RATE: 16 BRPM | TEMPERATURE: 98.1 F

## 2019-02-21 DIAGNOSIS — N64.4 BREAST PAIN, LEFT: Primary | ICD-10-CM

## 2019-02-21 DIAGNOSIS — J45.990 EXERCISE-INDUCED ASTHMA: ICD-10-CM

## 2019-02-21 DIAGNOSIS — R91.1 SOLITARY PULMONARY NODULE ON LUNG CT: ICD-10-CM

## 2019-02-21 DIAGNOSIS — G47.09 OTHER INSOMNIA: ICD-10-CM

## 2019-02-21 DIAGNOSIS — M72.2 PLANTAR FASCIITIS, LEFT: ICD-10-CM

## 2019-02-21 PROCEDURE — 99214 OFFICE O/P EST MOD 30 MIN: CPT | Performed by: INTERNAL MEDICINE

## 2019-02-21 RX ORDER — ZOLPIDEM TARTRATE 5 MG/1
5 TABLET ORAL
Qty: 30 TABLET | Refills: 1 | Status: SHIPPED | OUTPATIENT
Start: 2019-02-21 | End: 2020-10-22

## 2019-02-21 RX ORDER — ALBUTEROL SULFATE 90 UG/1
AEROSOL, METERED RESPIRATORY (INHALATION)
Qty: 6.7 INHALER | Refills: 3 | Status: SHIPPED | OUTPATIENT
Start: 2019-02-21 | End: 2020-10-22

## 2019-02-21 ASSESSMENT — MIFFLIN-ST. JEOR: SCORE: 1336.95

## 2019-02-21 ASSESSMENT — PAIN SCALES - GENERAL: PAINLEVEL: NO PAIN (0)

## 2019-02-21 NOTE — PROGRESS NOTES
SUBJECTIVE:   Jessie Candelario is a 48 year old female who presents to clinic today for the following health issues:      Medication Followup of    Taking Medication as prescribed: yes    Side Effects:  None    Medication Helping Symptoms:  yes     Patient has a history of exercise-induced asthma and chronic insomnia.  She is here to get refills of albuterol and Ambien.  She does not use albuterol very frequently, only when she exercise.  Does not needed at any other time.  She uses Ambien sparingly as well.  Her last prescription of Ambien of 30 tablets was prescribed from 2 years ago.    She has a history of solitary pulmonary nodule about 5 mm noted in 2016.  She had a follow-up CT scan in 2017 that was stable.  Because of her smoking history, it was recommended that she gets another CT scan in 1 years time.  This is overdue.  Patient quit smoking 20 years ago.    History of plantar fasciitis in both feet several years ago after a marathon.  The right foot has healed.  She continues to have some a chronic low-grade level of plantar pain of the left foot plantar fasciitis.  She will like to see a podiatrist at our clinic for this.    In the last few months, she also developed some intermittent pain around the left nipple.  She no longer has menstrual periods after endometrial ablation a couple years ago.  She has not correlated this pain with any particular cycle.  She does not currently have pain.  She will be due for mammogram in a couple of weeks.    ROS:  Constitutional, HEENT, cardiovascular, pulmonary, gi and gu systems are negative, except as otherwise noted.         Current Outpatient Medications on File Prior to Visit:  ibuprofen (ADVIL,MOTRIN) 200 MG tablet Take 800 mg by mouth four times a week.   UNABLE TO FIND MEDICATION NAME: Vitalreds   acetaminophen (TYLENOL) 325 MG tablet Take 1-2 tablets (325-650 mg) by mouth every 4 hours as needed for mild pain (Patient not taking: Reported on 2/21/2019)    Calcium Carbonate-Vitamin D (CALCIUM + D PO) Take by mouth daily     No current facility-administered medications on file prior to visit.        Patient Active Problem List   Diagnosis     CARDIOVASCULAR SCREENING; LDL GOAL LESS THAN 160     TMJ (temporomandibular joint syndrome)     ASCUS with positive high risk HPV     Contraception management     Consultation for sterilization     Exercise-induced asthma     Dense breast     Excessive, frequent and irregular menstruation     Submucous leiomyoma of uterus     Endometrial polyp     Past Surgical History:   Procedure Laterality Date     ARTHROSCOPY KNEE Left 3/3/2015    Procedure: ARTHROSCOPY KNEE;  Surgeon: Marcos Shen MD;  Location:  OR     C HAND/FINGER SURGERY UNLISTED       COLPOSCOPY CERVIX, BIOPSY CERVIX, ENDOCERVICAL CURETTAGE, COMBINED       DILATE CERVIX, ABLATE ENDOMETRIUM NOVASURE, COMBINED N/A 2/3/2017    Procedure: COMBINED DILATE CERVIX, ABLATE ENDOMETRIUM NOVASURE;  Surgeon: Marcos Sierra MD;  Location:  OR     HERNIA REPAIR  07     HYSTEROSCOPIC PLACEMENT CONTRACEPTIVE DEVICE  2014    Procedure: HYSTEROSCOPIC TUBAL LIGATION;  Hysteroscopic tubal ligation with Essure;  Surgeon: Marcos Sierra MD;  Location:  OR     OPERATIVE HYSTEROSCOPY WITH MORCELLATOR N/A 2/3/2017    Procedure: OPERATIVE HYSTEROSCOPY WITH MORCELLATOR (MYOSURE);  Surgeon: Marcos Sierra MD;  Location:  OR       Social History     Tobacco Use     Smoking status: Former Smoker     Packs/day: 0.50     Types: Cigarettes     Last attempt to quit: 1998     Years since quittin.1     Smokeless tobacco: Never Used   Substance Use Topics     Alcohol use: Yes     Comment: wine 6-7 glasses  per week     Family History   Problem Relation Age of Onset     Heart Disease Mother      Hypertension Mother      Alcohol/Drug Brother         alcoholic     Cancer Maternal Grandmother         melanoma     Heart Disease Maternal Grandfather          "arrythemia     Heart Disease Paternal Grandfather      Alcohol/Drug Brother         alcoholic             Problem list, Medication list, Allergies, and Medical/Social/Surgical histories reviewed in [facility] and updated as appropriate.    OBJECTIVE:                                                    /68 (BP Location: Right arm, Patient Position: Sitting, Cuff Size: Adult Large)   Pulse 54   Temp 98.1  F (36.7  C) (Tympanic)   Resp 16   Ht 1.695 m (5' 6.73\")   Wt 67.9 kg (149 lb 9.6 oz)   BMI 23.62 kg/m      GENERAL: healthy, alert and no distress  HEENT: unremarkable  Neck: no adenopathy/mass/stiffness. Thyroid normal.  Lung: clear, no wheezing/rhonchi/crackles  Heart: RRR, normal S1/2, no murmur/gallop/rup  Abd: soft, normal BS, non tender, no organomegaly   Ext: no cyanosis/clubbing/edema      Diagnostic test results:        ASSESSMENT/PLAN:                                                      48 year old female with the following diagnoses and treatment plan:      ICD-10-CM    1. Breast pain, left N64.4 MA Diagnostic Digital Bilateral     US Breast Left Complete 4 Quadrants   2. Other insomnia G47.09 zolpidem (AMBIEN) 5 MG tablet   3. Exercise-induced asthma J45.990 albuterol (VENTOLIN HFA) 108 (90 Base) MCG/ACT inhaler   4. Solitary pulmonary nodule on lung CT R91.1 CT Chest w/o Contrast   5. Plantar fasciitis, left M72.2 PODIATRY/FOOT & ANKLE SURGERY REFERRAL       --Patient was stable chronic health issues.  Ambien and albuterol refilled to be used on as-needed basis  --Diagnostic mammogram and left breast ultrasound due to left breast pain   -- history of solitary pulmonary nodule: We will obtain a CT scan, If this is stable she does not need any further monitoring.  --Left plantar fasciitis: Podiatry referral made.    Will call or return to clinic if worsening or symptoms not improving as discussed.  See Patient Instructions.      Kasia Dias MD-PhD  [facility]    (Note: Chart " documentation was done in part with Dragon Voice Recognition software. Although reviewed after completion, some word and grammatical errors may remain.)

## 2019-02-21 NOTE — NURSING NOTE
"  SUBJECTIVE:   Jessie Candelario is a 48 year old female who presents to clinic today for the following health issues:      Medication Followup of Albuterol and Ambien    Taking Medication as prescribed: yes     Side Effects:  None    Medication Helping Symptoms:  Yes, ambein does help sometimes to sleep       {additional problems for provider to add:838508}    Problem list and histories reviewed & adjusted, as indicated.  Additional history: {NONE - AS DOCUMENTED:751254::\"as documented\"}    {HIST REVIEW/ LINKS 2:672552}    Reviewed and updated as needed this visit by clinical staff  Tobacco  Allergies  Meds       Reviewed and updated as needed this visit by Provider         {PROVIDER CHARTING PREFERENCE:994355}    "

## 2019-02-21 NOTE — PATIENT INSTRUCTIONS
Make appointment(s) for:   -- chest CT  -- mammogram/ultrasound        Medication(s) prescribed today:    Orders Placed This Encounter   Medications     zolpidem (AMBIEN) 5 MG tablet     Sig: Take 1 tablet (5 mg) by mouth nightly as needed for sleep     Dispense:  30 tablet     Refill:  1     albuterol (VENTOLIN HFA) 108 (90 Base) MCG/ACT inhaler     Sig: INHALE 2 PUFFS INTO THE LUNGS EVERY 6 HOURS AS NEEDED FOR SHORTNESS OF BREATH / DYSPNEA     Dispense:  6.7 Inhaler     Refill:  3

## 2019-02-23 ASSESSMENT — ASTHMA QUESTIONNAIRES: ACT_TOTALSCORE: 22

## 2019-03-06 ENCOUNTER — ANCILLARY PROCEDURE (OUTPATIENT)
Dept: GENERAL RADIOLOGY | Facility: CLINIC | Age: 49
End: 2019-03-06
Attending: PODIATRIST
Payer: COMMERCIAL

## 2019-03-06 ENCOUNTER — OFFICE VISIT (OUTPATIENT)
Dept: PODIATRY | Facility: CLINIC | Age: 49
End: 2019-03-06
Payer: COMMERCIAL

## 2019-03-06 VITALS
OXYGEN SATURATION: 98 % | SYSTOLIC BLOOD PRESSURE: 127 MMHG | WEIGHT: 149 LBS | HEART RATE: 58 BPM | BODY MASS INDEX: 23.39 KG/M2 | DIASTOLIC BLOOD PRESSURE: 65 MMHG | HEIGHT: 67 IN

## 2019-03-06 DIAGNOSIS — M72.2 PLANTAR FASCIITIS OF LEFT FOOT: ICD-10-CM

## 2019-03-06 DIAGNOSIS — G57.62 MORTON'S NEURALGIA, LEFT: ICD-10-CM

## 2019-03-06 PROCEDURE — 99203 OFFICE O/P NEW LOW 30 MIN: CPT | Performed by: PODIATRIST

## 2019-03-06 PROCEDURE — 73630 X-RAY EXAM OF FOOT: CPT | Mod: LT | Performed by: RADIOLOGY

## 2019-03-06 ASSESSMENT — MIFFLIN-ST. JEOR: SCORE: 1334.52

## 2019-03-06 ASSESSMENT — PAIN SCALES - GENERAL: PAINLEVEL: MODERATE PAIN (4)

## 2019-03-06 NOTE — LETTER
3/6/2019         RE: Jessie Candelario  [address] [facility]   [place] [address]        Dear Colleague,    Thank you for referring your patient, Jessie Candelario, to the [facility]. Please see a copy of my visit note below.    Date of Service: 3/6/2019    Chief Complaint   Patient presents with     Consult     left foot pain          HPI: Jessie is a 48 year old female who presents today for further evaluation of left foot plantar fasciitis.  Nature: sharp/dull/aching    Location: left plantar foot    Duration: 5 years    Onset: gradual. Started after she trained and ran a 1/2 marathon 5 years ago.     Course: waxes and wanes.     Aggravating/alleviating factors: + post-static dyskinesia. Walking and weight bearing aggravate. Rest alleviates.     Previous Treatments: Stretching and rolling.  She is also having some pain between the 3rd and 4th toes on the left foot. She relates tingling, burning, and an electric type pain. Also feels some numbness in the lateral 3rd and medial 4th toe.       Review of Systems: No n/v/d/f/c/ns/sob/cp    PMH:   Past Medical History:   Diagnosis Date     ASCUS favoring benign 10/22/12    neg HR HPV (70)     Exercise-induced asthma 1/16/2014     H/O colposcopy with cervical biopsy 12/19/12    no bx taken.      IUD (intrauterine device) in place 10/24/2012     LSIL (low grade squamous intraepithelial lesion) on Pap smear 3/21/14    neg HPV     PONV (postoperative nausea and vomiting)        PSxH:   Past Surgical History:   Procedure Laterality Date     ARTHROSCOPY KNEE Left 3/3/2015    Procedure: ARTHROSCOPY KNEE;  Surgeon: Marcos Shen MD;  [facility]     C HAND/FINGER SURGERY UNLISTED       COLPOSCOPY CERVIX, BIOPSY CERVIX, ENDOCERVICAL CURETTAGE, COMBINED       DILATE CERVIX, ABLATE ENDOMETRIUM NOVASURE, COMBINED N/A 2/3/2017    Procedure: COMBINED DILATE CERVIX, ABLATE ENDOMETRIUM NOVASURE;  Surgeon: Marcos Sierra MD;   Location: [facility]     HERNIA REPAIR  07     HYSTEROSCOPIC PLACEMENT CONTRACEPTIVE DEVICE  2014    Procedure: HYSTEROSCOPIC TUBAL LIGATION;  Hysteroscopic tubal ligation with Essure;  Surgeon: Marcos Sierra MD;  Location: [facility]     OPERATIVE HYSTEROSCOPY WITH MORCELLATOR N/A 2/3/2017    Procedure: OPERATIVE HYSTEROSCOPY WITH MORCELLATOR (MYOSURE);  Surgeon: Marcos Sierra MD;  Location: [facility]       Allergies: Patient has no known allergies.    SH:   Social History     Socioeconomic History     Marital status:      Spouse name: Not on file     Number of children: Not on file     Years of education: Not on file     Highest education level: Not on file   Occupational History     Not on file   Social Needs     Financial resource strain: Not on file     Food insecurity:     Worry: Not on file     Inability: Not on file     Transportation needs:     Medical: Not on file     Non-medical: Not on file   Tobacco Use     Smoking status: Former Smoker     Packs/day: 0.50     Types: Cigarettes     Last attempt to quit: 1998     Years since quittin.1     Smokeless tobacco: Never Used   Substance and Sexual Activity     Alcohol use: Yes     Comment: wine 6-7 glasses  per week     Drug use: No     Sexual activity: Yes     Partners: Male   Lifestyle     Physical activity:     Days per week: Not on file     Minutes per session: Not on file     Stress: Not on file   Relationships     Social connections:     Talks on phone: Not on file     Gets together: Not on file     Attends Advent service: Not on file     Active member of club or organization: Not on file     Attends meetings of clubs or organizations: Not on file     Relationship status: Not on file     Intimate partner violence:     Fear of current or ex partner: Not on file     Emotionally abused: Not on file     Physically abused: Not on file     Forced sexual activity: Not on file   Other Topics Concern     Parent/sibling w/ CABG, MI or  "angioplasty before 65F 55M? Not Asked   Social History Narrative     Not on file       FH:   Family History   Problem Relation Age of Onset     Heart Disease Mother      Hypertension Mother      Alcohol/Drug Brother         alcoholic     Cancer Maternal Grandmother         melanoma     Heart Disease Maternal Grandfather         arrythemia     Heart Disease Paternal Grandfather      Alcohol/Drug Brother         alcoholic       Objective:    /65 (BP Location: Right arm, Patient Position: Sitting, Cuff Size: Adult Regular)   Pulse 58   Ht 1.695 m (5' 6.75\")   Wt 67.6 kg (149 lb)   SpO2 98%   BMI 23.51 kg/m         PT and DP pulses are 2/4 bilaterally. CRT is 3 seconds. Positive pedal hair.   Gross sensation is intact bilaterally. Some Tinel's sign with palpation of the left 3rd interspace. No Sidra's click. Paresthesia recreated with left 3rd interspace compression.   Equinus is mild bilaterally. No pain with active or passive ROM of the ankle, MTJ, 1st ray, or halluces bilaterally,. Pain noted with palpation of the medial attachment of the plantar fascia on the calcaneus. No pain along the courses of the PT, peroneal, or Achilles tendons on the left.   Nails normal bilaterally. No open lesions are noted.     left foot xrays indicated in 3 weightbearing views.    No fractures. Normal alignment. No soft tissue abnormality. Plantar calcaneal osteophyte noted.       Assessment: Left foot plantar fasciitis.  Left foot Del Rosario's neuroma.     Plan:  - Pt seen and evaluated.  - XRs taken and discussed with her.   - Recommend a pair of orthotics with holes in heels and met pads. These were molded and sent to the lab.  - Del Rosario's neuroma is most bothersome for her. Will order US to confirm this and plan on injection in the area.  - See after US is performed.              Again, thank you for allowing me to participate in the care of your patient.        Sincerely,        Pierre Putnam DPM    "

## 2019-03-06 NOTE — NURSING NOTE
"Jessie Candelario's chief complaint for this visit includes:  Chief Complaint   Patient presents with     Consult     left foot pain     PCP: Kasia Dias    Referring Provider:  Pierre Putnam DPM  [address] [place] [id number]    /65 (BP Location: Right arm, Patient Position: Sitting, Cuff Size: Adult Regular)   Pulse 58   Ht 1.695 m (5' 6.75\")   Wt 67.6 kg (149 lb)   SpO2 98%   BMI 23.51 kg/m    Moderate Pain (4)     Do you need any medication refills at today's visit? no    "

## 2019-03-06 NOTE — PROGRESS NOTES
Date of Service: 3/6/2019    Chief Complaint   Patient presents with     Consult     left foot pain          HPI: Jessie is a 48 year old female who presents today for further evaluation of left foot plantar fasciitis.  Nature: sharp/dull/aching    Location: left plantar foot    Duration: 5 years    Onset: gradual. Started after she trained and ran a 1/2 marathon 5 years ago.     Course: waxes and wanes.     Aggravating/alleviating factors: + post-static dyskinesia. Walking and weight bearing aggravate. Rest alleviates.     Previous Treatments: Stretching and rolling.  She is also having some pain between the 3rd and 4th toes on the left foot. She relates tingling, burning, and an electric type pain. Also feels some numbness in the lateral 3rd and medial 4th toe.       Review of Systems: No n/v/d/f/c/ns/sob/cp    PMH:   Past Medical History:   Diagnosis Date     ASCUS favoring benign 10/22/12    neg HR HPV (70)     Exercise-induced asthma 1/16/2014     H/O colposcopy with cervical biopsy 12/19/12    no bx taken.      IUD (intrauterine device) in place 10/24/2012     LSIL (low grade squamous intraepithelial lesion) on Pap smear 3/21/14    neg HPV     PONV (postoperative nausea and vomiting)        PSxH:   Past Surgical History:   Procedure Laterality Date     ARTHROSCOPY KNEE Left 3/3/2015    Procedure: ARTHROSCOPY KNEE;  Surgeon: Marcos Shen MD;  Location:  OR      HAND/FINGER SURGERY UNLISTED       COLPOSCOPY CERVIX, BIOPSY CERVIX, ENDOCERVICAL CURETTAGE, COMBINED       DILATE CERVIX, ABLATE ENDOMETRIUM NOVASURE, COMBINED N/A 2/3/2017    Procedure: COMBINED DILATE CERVIX, ABLATE ENDOMETRIUM NOVASURE;  Surgeon: Marcos Sirera MD;  Location:  OR     HERNIA REPAIR  2/5/07     HYSTEROSCOPIC PLACEMENT CONTRACEPTIVE DEVICE  2/7/2014    Procedure: HYSTEROSCOPIC TUBAL LIGATION;  Hysteroscopic tubal ligation with Essure;  Surgeon: Marcos Sierra MD;  Location:  OR     OPERATIVE HYSTEROSCOPY  WITH MORCELLATOR N/A 2/3/2017    Procedure: OPERATIVE HYSTEROSCOPY WITH MORCELLATOR (MYOSURE);  Surgeon: Marcos Sierra MD;  Location: [facility]       Allergies: Patient has no known allergies.    SH:   Social History     Socioeconomic History     Marital status:      Spouse name: Not on file     Number of children: Not on file     Years of education: Not on file     Highest education level: Not on file   Occupational History     Not on file   Social Needs     Financial resource strain: Not on file     Food insecurity:     Worry: Not on file     Inability: Not on file     Transportation needs:     Medical: Not on file     Non-medical: Not on file   Tobacco Use     Smoking status: Former Smoker     Packs/day: 0.50     Types: Cigarettes     Last attempt to quit: 1998     Years since quittin.1     Smokeless tobacco: Never Used   Substance and Sexual Activity     Alcohol use: Yes     Comment: wine 6-7 glasses  per week     Drug use: No     Sexual activity: Yes     Partners: Male   Lifestyle     Physical activity:     Days per week: Not on file     Minutes per session: Not on file     Stress: Not on file   Relationships     Social connections:     Talks on phone: Not on file     Gets together: Not on file     Attends Baptism service: Not on file     Active member of club or organization: Not on file     Attends meetings of clubs or organizations: Not on file     Relationship status: Not on file     Intimate partner violence:     Fear of current or ex partner: Not on file     Emotionally abused: Not on file     Physically abused: Not on file     Forced sexual activity: Not on file   Other Topics Concern     Parent/sibling w/ CABG, MI or angioplasty before 65F 55M? Not Asked   Social History Narrative     Not on file       FH:   Family History   Problem Relation Age of Onset     Heart Disease Mother      Hypertension Mother      Alcohol/Drug Brother         alcoholic     Cancer Maternal Grandmother        "  melanoma     Heart Disease Maternal Grandfather         arrythemia     Heart Disease Paternal Grandfather      Alcohol/Drug Brother         alcoholic       Objective:    /65 (BP Location: Right arm, Patient Position: Sitting, Cuff Size: Adult Regular)   Pulse 58   Ht 1.695 m (5' 6.75\")   Wt 67.6 kg (149 lb)   SpO2 98%   BMI 23.51 kg/m        PT and DP pulses are 2/4 bilaterally. CRT is 3 seconds. Positive pedal hair.   Gross sensation is intact bilaterally. Some Tinel's sign with palpation of the left 3rd interspace. No Sidra's click. Paresthesia recreated with left 3rd interspace compression.   Equinus is mild bilaterally. No pain with active or passive ROM of the ankle, MTJ, 1st ray, or halluces bilaterally,. Pain noted with palpation of the medial attachment of the plantar fascia on the calcaneus. No pain along the courses of the PT, peroneal, or Achilles tendons on the left.   Nails normal bilaterally. No open lesions are noted.     left foot xrays indicated in 3 weightbearing views.    No fractures. Normal alignment. No soft tissue abnormality. Plantar calcaneal osteophyte noted.       Assessment: Left foot plantar fasciitis.  Left foot Del Rosario's neuroma.     Plan:  - Pt seen and evaluated.  - XRs taken and discussed with her.   - Recommend a pair of orthotics with holes in heels and met pads. These were molded and sent to the lab.  - Del Rosario's neuroma is most bothersome for her. Will order US to confirm this and plan on injection in the area.  - See after US is performed.            "

## 2019-03-06 NOTE — PATIENT INSTRUCTIONS
Thanks for coming today.  [facility]  [address] [place] [address]    To schedule future appointments in Ortho Clinic, you may call [phone].    To schedule ordered imaging by your provider:   Call Central Imaging [phone]    To schedule an injection ordered by your provider:  Call Central [facility] Injection scheduling line: [phone]  [facility] [employer] available online at:  [email or web address]    Please call if any further questions or concerns ([phone]).  Clinic hours 8 am to 5 pm.    Return to clinic (call) if symptoms worsen or fail to improve.

## 2019-03-07 ENCOUNTER — ANCILLARY PROCEDURE (OUTPATIENT)
Dept: ULTRASOUND IMAGING | Facility: CLINIC | Age: 49
End: 2019-03-07
Attending: INTERNAL MEDICINE
Payer: COMMERCIAL

## 2019-03-07 ENCOUNTER — ANCILLARY PROCEDURE (OUTPATIENT)
Dept: MAMMOGRAPHY | Facility: CLINIC | Age: 49
End: 2019-03-07
Attending: INTERNAL MEDICINE
Payer: COMMERCIAL

## 2019-03-07 ENCOUNTER — ANCILLARY PROCEDURE (OUTPATIENT)
Dept: CT IMAGING | Facility: CLINIC | Age: 49
End: 2019-03-07
Attending: INTERNAL MEDICINE
Payer: COMMERCIAL

## 2019-03-07 DIAGNOSIS — R91.1 SOLITARY PULMONARY NODULE ON LUNG CT: ICD-10-CM

## 2019-03-07 DIAGNOSIS — N64.4 BREAST PAIN, LEFT: ICD-10-CM

## 2019-03-07 PROCEDURE — G0279 TOMOSYNTHESIS, MAMMO: HCPCS | Performed by: STUDENT IN AN ORGANIZED HEALTH CARE EDUCATION/TRAINING PROGRAM

## 2019-03-07 PROCEDURE — 76642 ULTRASOUND BREAST LIMITED: CPT | Mod: LT | Performed by: STUDENT IN AN ORGANIZED HEALTH CARE EDUCATION/TRAINING PROGRAM

## 2019-03-07 PROCEDURE — 71250 CT THORAX DX C-: CPT | Performed by: RADIOLOGY

## 2019-03-07 PROCEDURE — 77066 DX MAMMO INCL CAD BI: CPT | Performed by: STUDENT IN AN ORGANIZED HEALTH CARE EDUCATION/TRAINING PROGRAM

## 2019-03-22 ENCOUNTER — ANCILLARY PROCEDURE (OUTPATIENT)
Dept: ULTRASOUND IMAGING | Facility: CLINIC | Age: 49
End: 2019-03-22
Attending: PODIATRIST
Payer: COMMERCIAL

## 2019-03-22 DIAGNOSIS — G57.62 MORTON'S NEURALGIA, LEFT: ICD-10-CM

## 2019-04-03 ENCOUNTER — DOCUMENTATION ONLY (OUTPATIENT)
Dept: ORTHOPEDICS | Facility: CLINIC | Age: 49
End: 2019-04-03

## 2019-04-03 NOTE — PROGRESS NOTES
S: Pt. arrived to our [facility] facility today for a fitting for her new custom, foot orthotics.  The evaluation/impressions were done by Dr. Putnam.    O/Goals: The objective/Goals of orthotics are to help support medial arches and reduce left plantar fasciitis pain.    A: I have fit pt. with bilateral, custom foot orthotics fabricated by [employer]. FO s have been trimmed to fit into her current footwear (tennis shoes.)  Pt. has been instructed with proper donning, doffing, cleaning, and skin care. A [employer] instructional sheet has been given to pt. and gone through thoroughly. Pt. has ambulated with the orthotics and is satisfied with the overall fit and comfort.    P: Pt. has been instructed to contact our facility with any future questions and/or concerns.    [facility] Licensed Orthotist , [facility] Certified Orthotist

## 2020-07-01 ENCOUNTER — ANCILLARY PROCEDURE (OUTPATIENT)
Dept: MAMMOGRAPHY | Facility: CLINIC | Age: 50
End: 2020-07-01
Attending: INTERNAL MEDICINE
Payer: COMMERCIAL

## 2020-07-01 DIAGNOSIS — Z12.31 SCREENING MAMMOGRAM, ENCOUNTER FOR: ICD-10-CM

## 2020-07-01 PROCEDURE — 77063 BREAST TOMOSYNTHESIS BI: CPT | Performed by: RADIOLOGY

## 2020-07-01 PROCEDURE — 77067 SCR MAMMO BI INCL CAD: CPT | Performed by: RADIOLOGY

## 2020-10-17 ENCOUNTER — VIRTUAL VISIT (OUTPATIENT)
Dept: FAMILY MEDICINE | Facility: OTHER | Age: 50
End: 2020-10-17
Payer: COMMERCIAL

## 2020-10-17 PROCEDURE — 99421 OL DIG E/M SVC 5-10 MIN: CPT | Performed by: PHYSICIAN ASSISTANT

## 2020-10-22 ENCOUNTER — OFFICE VISIT (OUTPATIENT)
Dept: PEDIATRICS | Facility: CLINIC | Age: 50
End: 2020-10-22
Payer: COMMERCIAL

## 2020-10-22 VITALS
DIASTOLIC BLOOD PRESSURE: 73 MMHG | HEIGHT: 67 IN | WEIGHT: 153.2 LBS | HEART RATE: 52 BPM | OXYGEN SATURATION: 99 % | SYSTOLIC BLOOD PRESSURE: 122 MMHG | BODY MASS INDEX: 24.04 KG/M2 | TEMPERATURE: 98.3 F

## 2020-10-22 DIAGNOSIS — Z13.1 SCREENING FOR DIABETES MELLITUS: ICD-10-CM

## 2020-10-22 DIAGNOSIS — Z00.00 ROUTINE GENERAL MEDICAL EXAMINATION AT A HEALTH CARE FACILITY: Primary | ICD-10-CM

## 2020-10-22 DIAGNOSIS — Z12.4 SCREENING FOR MALIGNANT NEOPLASM OF CERVIX: ICD-10-CM

## 2020-10-22 DIAGNOSIS — M26.609 TMJ (TEMPOROMANDIBULAR JOINT SYNDROME): ICD-10-CM

## 2020-10-22 DIAGNOSIS — Z13.6 CARDIOVASCULAR SCREENING; LDL GOAL LESS THAN 160: ICD-10-CM

## 2020-10-22 DIAGNOSIS — J45.990 EXERCISE-INDUCED ASTHMA: ICD-10-CM

## 2020-10-22 PROCEDURE — 90471 IMMUNIZATION ADMIN: CPT | Performed by: INTERNAL MEDICINE

## 2020-10-22 PROCEDURE — G0145 SCR C/V CYTO,THINLAYER,RESCR: HCPCS | Performed by: INTERNAL MEDICINE

## 2020-10-22 PROCEDURE — 87624 HPV HI-RISK TYP POOLED RSLT: CPT | Performed by: INTERNAL MEDICINE

## 2020-10-22 PROCEDURE — 99396 PREV VISIT EST AGE 40-64: CPT | Mod: 25 | Performed by: INTERNAL MEDICINE

## 2020-10-22 PROCEDURE — 90732 PPSV23 VACC 2 YRS+ SUBQ/IM: CPT | Performed by: INTERNAL MEDICINE

## 2020-10-22 RX ORDER — AMOXICILLIN 500 MG
1200 CAPSULE ORAL DAILY
COMMUNITY

## 2020-10-22 RX ORDER — UBIDECARENONE 100 MG
50 CAPSULE ORAL DAILY
COMMUNITY

## 2020-10-22 RX ORDER — ALBUTEROL SULFATE 90 UG/1
AEROSOL, METERED RESPIRATORY (INHALATION)
Qty: 6.7 INHALER | Refills: 3 | Status: SHIPPED | OUTPATIENT
Start: 2020-10-22 | End: 2022-04-15

## 2020-10-22 ASSESSMENT — MIFFLIN-ST. JEOR: SCORE: 1347.54

## 2020-10-22 NOTE — LETTER
My Asthma Action Plan    Name: Jessie Candelario   YOB: 1970  Date: 11/6/2020   My doctor: Kasia Dias MD PhD   My clinic: [facility]        My Rescue Medicine:   Albuterol inhaler (Proair/Ventolin/Proventil HFA)  2-4 puffs EVERY 4 HOURS as needed. Use a spacer if recommended by your provider.   My Asthma Severity:   Intermittent / Exercise Induced  Know your asthma triggers: exercise or sports             GREEN ZONE   Good Control    I feel good    No cough or wheeze    Can work, sleep and play without asthma symptoms       Take your asthma control medicine every day.     1. If exercise triggers your asthma, take your rescue medication    15 minutes before exercise or sports, and    During exercise if you have asthma symptoms  2. Spacer to use with inhaler: If you have a spacer, make sure to use it with your inhaler             YELLOW ZONE Getting Worse  I have ANY of these:    I do not feel good    Cough or wheeze    Chest feels tight    Wake up at night   1. Keep taking your Green Zone medications  2. Start taking your rescue medicine:    every 20 minutes for up to 1 hour. Then every 4 hours for 24-48 hours.  3. If you stay in the Yellow Zone for more than 12-24 hours, contact your doctor.  4. If you do not return to the Green Zone in 12-24 hours or you get worse, start taking your oral steroid medicine if prescribed by your provider.           RED ZONE Medical Alert - Get Help  I have ANY of these:    I feel awful    Medicine is not helping    Breathing getting harder    Trouble walking or talking    Nose opens wide to breathe       1. Take your rescue medicine NOW  2. If your provider has prescribed an oral steroid medicine, start taking it NOW  3. Call your doctor NOW  4. If you are still in the Red Zone after 20 minutes and you have not reached your doctor:    Take your rescue medicine again and    Call 911 or go to the emergency room right away    See your regular  doctor within 2 weeks of an Emergency Room or Urgent Care visit for follow-up treatment.          Annual Reminders:  Meet with Asthma Educator,  Flu Shot in the Fall, consider Pneumonia Vaccination for patients with asthma (aged 19 and older).    Pharmacy:    [id number] - [place], [place] - [address]  [facility] [place], [place] - [address], [address]    Electronically signed by Kasia Dias MD PhD   Date: 11/06/20                    Asthma Triggers  How To Control Things That Make Your Asthma Worse    Triggers are things that make your asthma worse.  Look at the list below to help you find your triggers and   what you can do about them. You can help prevent asthma flare-ups by staying away from your triggers.      Trigger                                                          What you can do   Cigarette Smoke  Tobacco smoke can make asthma worse. Do not allow smoking in your home, car or around you.  Be sure no one smokes at a child s day care or school.  If you smoke, ask your health care provider for ways to help you quit.  Ask family members to quit too.  Ask your health care provider for a referral to Quit Plan to help you quit smoking, or call 4-193-516-PLAN.     Colds, Flu, Bronchitis  These are common triggers of asthma. Wash your hands often.  Don t touch your eyes, nose or mouth.  Get a flu shot every year.     Dust Mites  These are tiny bugs that live in cloth or carpet. They are too small to see. Wash sheets and blankets in hot water every week.   Encase pillows and mattress in dust mite proof covers.  Avoid having carpet if you can. If you have carpet, vacuum weekly.   Use a dust mask and HEPA vacuum.   Pollen and Outdoor Mold  Some people are allergic to trees, grass, or weed pollen, or molds. Try to keep your windows closed.  Limit time out doors when pollen count is high.   Ask you health care provider about taking medicine during allergy season.      Animal Dander  Some people are allergic to skin flakes, urine or saliva from pets with fur or feathers. Keep pets with fur or feathers out of your home.    If you can t keep the pet outdoors, then keep the pet out of your bedroom.  Keep the bedroom door closed.  Keep pets off cloth furniture and away from stuffed toys.     Mice, Rats, and Cockroaches  Some people are allergic to the waste from these pests.   Cover food and garbage.  Clean up spills and food crumbs.  Store grease in the refrigerator.   Keep food out of the bedroom.   Indoor Mold  This can be a trigger if your home has high moisture. Fix leaking faucets, pipes, or other sources of water.   Clean moldy surfaces.  Dehumidify basement if it is damp and smelly.   Smoke, Strong Odors, and Sprays  These can reduce air quality. Stay away from strong odors and sprays, such as perfume, powder, hair spray, paints, smoke incense, paint, cleaning products, candles and new carpet.   Exercise or Sports  Some people with asthma have this trigger. Be active!  Ask your doctor about taking medicine before sports or exercise to prevent symptoms.    Warm up for 5-10 minutes before and after sports or exercise.     Other Triggers of Asthma  Cold air:  Cover your nose and mouth with a scarf.  Sometimes laughing or crying can be a trigger.  Some medicines and food can trigger asthma.

## 2020-10-22 NOTE — NURSING NOTE
Prior to immunization administration, verified patients identity using patient s name and date of birth. Please see Immunization Activity for additional information.     Screening Questionnaire for Adult Immunization    Are you sick today?   No   Do you have allergies to medications, food, a vaccine component or latex?   No   Have you ever had a serious reaction after receiving a vaccination?   No   Do you have a long-term health problem with heart, lung, kidney, or metabolic disease (e.g., diabetes), asthma, a blood disorder, no spleen, complement component deficiency, a cochlear implant, or a spinal fluid leak?  Are you on long-term aspirin therapy?   No   Do you have cancer, leukemia, HIV/AIDS, or any other immune system problem?   No   Do you have a parent, brother, or sister with an immune system problem?   No   In the past 3 months, have you taken medications that affect  your immune system, such as prednisone, other steroids, or anticancer drugs; drugs for the treatment of rheumatoid arthritis, Crohn s disease, or psoriasis; or have you had radiation treatments?   No   Have you had a seizure, or a brain or other nervous system problem?   No   During the past year, have you received a transfusion of blood or blood    products, or been given immune (gamma) globulin or antiviral drug?   No   For women: Are you pregnant or is there a chance you could become       pregnant during the next month?   No   Have you received any vaccinations in the past 4 weeks?   No     Immunization questionnaire answers were all negative.        Per orders of Dr. Dias, injection of Pneumo 23 given by Maribell Shah RN. Patient instructed to remain in clinic for 15 minutes afterwards, and to report any adverse reaction to me immediately.       Screening performed by Maribell Shah RN on 10/22/2020 at 2:30 PM.

## 2020-10-22 NOTE — PROGRESS NOTES
SUBJECTIVE:   CC: Jessie Candelario is an 50 year old woman who presents for preventive health visit.     {Split Bill scripting  The purpose of this visit is to discuss your medical history and prevent health problems before you are sick. You may be responsible for a co-pay, coinsurance, or deductible if your visit today includes services such as checking on a sore throat, having an x-ray or lab test, or treating and evaluating a new or existing condition :575608}  Patient has been advised of split billing requirements and indicates understanding: {Yes and No:739939}  Healthy Habits:    Do you get at least three servings of calcium containing foods daily (dairy, green leafy vegetables, etc.)? yes    Amount of exercise or daily activities, outside of work: 4-5 day(s) per week    Problems taking medications regularly No    Medication side effects: No    Have you had an eye exam in the past two years? yes    Do you see a dentist twice per year? yes    Do you have sleep apnea, excessive snoring or daytime drowsiness?no      {additional problems to add (Optional):996849}    Today's PHQ-2 Score:   PHQ-2 (  [employer]) 10/21/2020 2019   Q1: Little interest or pleasure in doing things 0 0   Q2: Feeling down, depressed or hopeless 0 0   PHQ-2 Score 0 0   Q1: Little interest or pleasure in doing things Not at all -   Q2: Feeling down, depressed or hopeless Not at all -   PHQ-2 Score 0 -       Abuse: Current or Past(Physical, Sexual or Emotional)- {YES/NO/NA:370539}  Do you feel safe in your environment? {YES/NO/NA:981807}        Social History     Tobacco Use     Smoking status: Former Smoker     Packs/day: 0.50     Types: Cigarettes     Quit date: 1998     Years since quittin.7     Smokeless tobacco: Never Used   Substance Use Topics     Alcohol use: Yes     Comment: wine 6-7 glasses  per week     If you drink alcohol do you typically have >3 drinks per day or >7 drinks per week? No                    "  Reviewed orders with patient.  Reviewed health maintenance and updated orders accordingly - {Yes/No:778692::\"Yes\"}  {Chronicprobdata (Optional):865312}    {Mammo Decision Support (Optional):733769}    Pertinent mammograms are reviewed under the imaging tab.  History of abnormal Pap smear: {PAP HX:457225}  PAP / HPV Latest Ref Rng & Units 5/20/2015 3/21/2014 10/22/2012   PAP - NIL LSIL(A) ASC-US(A)   HPV 16 DNA NEG Negative - -   HPV 18 DNA NEG Negative - -   OTHER HR HPV NEG Negative - -     Reviewed and updated as needed this visit by clinical staff                 Reviewed and updated as needed this visit by Provider                {HISTORY OPTIONS (Optional):212684}    ROS:  { :669980}    OBJECTIVE:   There were no vitals taken for this visit.  EXAM:  {Exam Choices:419482}    {Diagnostic Test Results (Optional):251041::\"Diagnostic Test Results:\",\"Labs reviewed in Epic\"}    ASSESSMENT/PLAN:   {Diag Picklist:187061}    Patient has been advised of split billing requirements and indicates understanding: {YES / NO:353699::\"Yes\"}  COUNSELING:   {FEMALE COUNSELING MESSAGES:952198::\"Reviewed preventive health counseling, as reflected in patient instructions\"}    Estimated body mass index is 23.51 kg/m  as calculated from the following:    Height as of 3/6/19: 1.695 m (5' 6.75\").    Weight as of 3/6/19: 67.6 kg (149 lb).    {Weight Management Plan (ACO) Complete if BMI is abnormal-  Ages 18-64  BMI >24.9.  Age 65+ with BMI <23 or >30 (Optional):771938}    She reports that she quit smoking about 22 years ago. Her smoking use included cigarettes. She smoked 0.50 packs per day. She has never used smokeless tobacco.      Counseling Resources:  ATP IV Guidelines  Pooled Cohorts Equation Calculator  Breast Cancer Risk Calculator  BRCA-Related Cancer Risk Assessment: FHS-7 Tool  FRAX Risk Assessment  ICSI Preventive Guidelines  Dietary Guidelines for Americans, 2010  USDA's MyPlate  ASA Prophylaxis  Lung CA Screening    Libin " MD Arun PhD  [facility]

## 2020-10-22 NOTE — PATIENT INSTRUCTIONS
Make appointment(s) for:   -- fasting lab appointment     TMJ clinics  [facility] in [place]  [phone]    [facility] in [place]  [phone]      Preventive Health Recommendations  Female Ages 50 - 64    Yearly exam: See your health care provider every year in order to  o Review health changes.   o Discuss preventive care.    o Review your medicines if your doctor has prescribed any.      Get a Pap test every three years (unless you have an abnormal result and your provider advises testing more often).    If you get Pap tests with HPV test, you only need to test every 5 years, unless you have an abnormal result.     You do not need a Pap test if your uterus was removed (hysterectomy) and you have not had cancer.    You should be tested each year for STDs (sexually transmitted diseases) if you're at risk.     Have a mammogram every 1 to 2 years.    Have a colonoscopy at age 50, or have a yearly FIT test (stool test). These exams screen for colon cancer.      Have a cholesterol test every 5 years, or more often if advised.    Have a diabetes test (fasting glucose) every three years. If you are at risk for diabetes, you should have this test more often.     If you are at risk for osteoporosis (brittle bone disease), think about having a bone density scan (DEXA).    Shots: Get a flu shot each year. Get a tetanus shot every 10 years.    Nutrition:     Eat at least 5 servings of fruits and vegetables each day.    Eat whole-grain bread, whole-wheat pasta and brown rice instead of white grains and rice.    Get adequate Calcium and Vitamin D.     Lifestyle    Exercise at least 150 minutes a week (30 minutes a day, 5 days a week). This will help you control your weight and prevent disease.    Limit alcohol to one drink per day.    No smoking.     Wear sunscreen to prevent skin cancer.     See your dentist every six months for an exam and cleaning.    See your eye  doctor every 1 to 2 years.    Preventive Health Recommendations  Female Ages 50 - 64    Yearly exam: See your health care provider every year in order to  o Review health changes.   o Discuss preventive care.    o Review your medicines if your doctor has prescribed any.      Get a Pap test every three years (unless you have an abnormal result and your provider advises testing more often).    If you get Pap tests with HPV test, you only need to test every 5 years, unless you have an abnormal result.     You do not need a Pap test if your uterus was removed (hysterectomy) and you have not had cancer.    You should be tested each year for STDs (sexually transmitted diseases) if you're at risk.     Have a mammogram every 1 to 2 years.    Have a colonoscopy at age 50, or have a yearly FIT test (stool test). These exams screen for colon cancer.      Have a cholesterol test every 5 years, or more often if advised.    Have a diabetes test (fasting glucose) every three years. If you are at risk for diabetes, you should have this test more often.     If you are at risk for osteoporosis (brittle bone disease), think about having a bone density scan (DEXA).    Shots: Get a flu shot each year. Get a tetanus shot every 10 years.    Nutrition:     Eat at least 5 servings of fruits and vegetables each day.    Eat whole-grain bread, whole-wheat pasta and brown rice instead of white grains and rice.    Get adequate Calcium and Vitamin D.     Lifestyle    Exercise at least 150 minutes a week (30 minutes a day, 5 days a week). This will help you control your weight and prevent disease.    Limit alcohol to one drink per day.    No smoking.     Wear sunscreen to prevent skin cancer.     See your dentist every six months for an exam and cleaning.    See your eye doctor every 1 to 2 years.

## 2020-10-22 NOTE — PROGRESS NOTES
SUBJECTIVE:   CC: Jessie Candelario is an 50 year old woman who presents for preventive health visit.     HPI:  Doing well. Known TMJ, would like to know where to get help.  Has exercise induced asthma, uses albuterol as needed, due for refill.     Patient has been advised of split billing requirements and indicates understanding: Yes  Healthy Habits:    Do you get at least three servings of calcium containing foods daily (dairy, green leafy vegetables, etc.)? yes    Amount of exercise or daily activities, outside of work: 4 day(s) per week    Problems taking medications regularly No    Medication side effects: No    Have you had an eye exam in the past two years? yes    Do you see a dentist twice per year? yes    Do you have sleep apnea, excessive snoring or daytime drowsiness?no    Today's PHQ-2 Score:   PHQ-2 (  [employer]) 10/22/2020 10/21/2020   Q1: Little interest or pleasure in doing things 0 0   Q2: Feeling down, depressed or hopeless 0 0   PHQ-2 Score 0 0   Q1: Little interest or pleasure in doing things - Not at all   Q2: Feeling down, depressed or hopeless - Not at all   PHQ-2 Score - 0       Abuse: Current or Past(Physical, Sexual or Emotional)- No  Do you feel safe in your environment? Yes        Social History     Tobacco Use     Smoking status: Former Smoker     Packs/day: 0.50     Types: Cigarettes     Quit date: 1998     Years since quittin.8     Smokeless tobacco: Never Used   Substance Use Topics     Alcohol use: Yes     Comment: wine 6-7 glasses  per week     If you drink alcohol do you typically have >3 drinks per day or >7 drinks per week? No                     Reviewed orders with patient.  Reviewed health maintenance and updated orders accordingly - Yes  Labs reviewed in EPIC    Mammogram Screening: Patient over age 50, mutual decision to screen reflected in health maintenance.    Pertinent mammograms are reviewed under the imaging tab.  History of abnormal Pap smear: NO - age  "30-65 PAP every 5 years with negative HPV co-testing recommended  PAP / HPV Latest Ref Rng & Units 10/22/2020 5/20/2015 3/21/2014   PAP - NIL NIL LSIL(A)   HPV 16 DNA NEG:Negative Negative Negative -   HPV 18 DNA NEG:Negative Negative Negative -   OTHER HR HPV NEG:Negative Negative Negative -     Reviewed and updated as needed this visit by clinical staff   Allergies               Reviewed and updated as needed this visit by Provider                    ROS:  CONSTITUTIONAL: NEGATIVE for fever, chills, change in weight  INTEGUMENTARY/SKIN: NEGATIVE for worrisome rashes, moles or lesions  EYES: NEGATIVE for vision changes or irritation  ENT: NEGATIVE for ear, mouth and throat problems  RESP: NEGATIVE for significant cough or SOB  BREAST: NEGATIVE for masses, tenderness or discharge  CV: NEGATIVE for chest pain, palpitations or peripheral edema  GI: NEGATIVE for nausea, abdominal pain, heartburn, or change in bowel habits  : NEGATIVE for unusual urinary or vaginal symptoms. No vaginal bleeding.  MUSCULOSKELETAL: NEGATIVE for significant arthralgias or myalgia  NEURO: NEGATIVE for weakness, dizziness or paresthesias  PSYCHIATRIC: NEGATIVE for changes in mood or affect     OBJECTIVE:   /73   Pulse 52   Temp 98.3  F (36.8  C) (Temporal)   Ht 1.702 m (5' 7\")   Wt 69.5 kg (153 lb 3.2 oz)   SpO2 99%   BMI 23.99 kg/m    EXAM:  GENERAL: healthy, alert and no distress  EYES: Eyes grossly normal to inspection, PERRL and conjunctivae and sclerae normal  HENT: ear canals and TM's normal, nose and mouth without ulcers or lesions  NECK: no adenopathy, no asymmetry, masses, or scars and thyroid normal to palpation  RESP: lungs clear to auscultation - no rales, rhonchi or wheezes  BREAST: normal without masses, tenderness or nipple discharge and no palpable axillary masses or adenopathy  CV: regular rate and rhythm, normal S1 S2, no S3 or S4, no murmur, click or rub, no peripheral edema and peripheral pulses " "strong  ABDOMEN: soft, nontender, no hepatosplenomegaly, no masses and bowel sounds normal   (female): normal female external genitalia, normal urethral meatus, vaginal mucosa pink, moist, well rugated, and normal cervix/adnexa/uterus without masses or discharge  MS: no gross musculoskeletal defects noted, no edema  SKIN: no suspicious lesions or rashes  NEURO: Normal strength and tone, mentation intact and speech normal  PSYCH: mentation appears normal, affect normal/bright    Diagnostic Test Results:  none     ASSESSMENT/PLAN:       ICD-10-CM    1. Routine general medical examination at a health care facility  Z00.00    2. TMJ (temporomandibular joint syndrome)  M26.609    3. Screening for malignant neoplasm of cervix  Z12.4 Pap imaged thin layer screen with HPV - recommended age 30 - 65 years (select HPV order below)     HPV High Risk Types DNA Cervical   4. CARDIOVASCULAR SCREENING; LDL GOAL LESS THAN 160  Z13.6 Lipid panel reflex to direct LDL Fasting   5. Screening for diabetes mellitus  Z13.1 Glucose   6. Exercise-induced asthma  J45.990 albuterol (VENTOLIN HFA) 108 (90 Base) MCG/ACT inhaler     Gave info for TMJ clinic. Albuterol refilled.  Return for fasting labs.     Preventive screening:  Colon cancer screening: up to date  Breast cancer screening: up to date  Cervical cancer screening: done today  Immunization: up to date except for Shingrix. Pt will check with insurance for coverage.    Patient has been advised of split billing requirements and indicates understanding: No  COUNSELING:   Reviewed preventive health counseling, as reflected in patient instructions    Estimated body mass index is 23.99 kg/m  as calculated from the following:    Height as of this encounter: 1.702 m (5' 7\").    Weight as of this encounter: 69.5 kg (153 lb 3.2 oz).        She reports that she quit smoking about 22 years ago. Her smoking use included cigarettes. She smoked 0.50 packs per day. She has never used smokeless " tobacco.      Counseling Resources:  ATP IV Guidelines  Pooled Cohorts Equation Calculator  Breast Cancer Risk Calculator  BRCA-Related Cancer Risk Assessment: FHS-7 Tool  FRAX Risk Assessment  ICSI Preventive Guidelines  Dietary Guidelines for Americans, 2010  sharing.it's MyPlate  ASA Prophylaxis  Lung CA Screening    Kasia Dias MD PhD  [facility]  Answers for HPI/ROS submitted by the patient on 10/21/2020   Annual Exam:  Frequency of exercise:: 4-5 days/week  Getting at least 3 servings of Calcium per day:: Yes  Diet:: Regular (no restrictions)  Taking medications regularly:: Yes  Medication side effects:: Not applicable  Bi-annual eye exam:: Yes  Dental care twice a year:: Yes  Sleep apnea or symptoms of sleep apnea:: None  Additional concerns today:: No  Duration of exercise:: 30-45 minutes

## 2020-10-23 ASSESSMENT — ASTHMA QUESTIONNAIRES: ACT_TOTALSCORE: 23

## 2020-10-27 LAB
COPATH REPORT: NORMAL
PAP: NORMAL

## 2020-10-28 LAB
FINAL DIAGNOSIS: NORMAL
HPV HR 12 DNA CVX QL NAA+PROBE: NEGATIVE
HPV16 DNA SPEC QL NAA+PROBE: NEGATIVE
HPV18 DNA SPEC QL NAA+PROBE: NEGATIVE
SPECIMEN DESCRIPTION: NORMAL
SPECIMEN SOURCE CVX/VAG CYTO: NORMAL

## 2020-11-03 DIAGNOSIS — Z13.1 SCREENING FOR DIABETES MELLITUS: ICD-10-CM

## 2020-11-03 DIAGNOSIS — Z13.6 CARDIOVASCULAR SCREENING; LDL GOAL LESS THAN 160: ICD-10-CM

## 2020-11-03 LAB
CHOLEST SERPL-MCNC: 175 MG/DL
GLUCOSE SERPL-MCNC: 91 MG/DL (ref 70–99)
HDLC SERPL-MCNC: 87 MG/DL
LDLC SERPL CALC-MCNC: 76 MG/DL
NONHDLC SERPL-MCNC: 88 MG/DL
TRIGL SERPL-MCNC: 62 MG/DL

## 2020-11-03 PROCEDURE — 36415 COLL VENOUS BLD VENIPUNCTURE: CPT | Performed by: INTERNAL MEDICINE

## 2020-11-03 PROCEDURE — 80061 LIPID PANEL: CPT | Performed by: INTERNAL MEDICINE

## 2020-11-03 PROCEDURE — 82947 ASSAY GLUCOSE BLOOD QUANT: CPT | Performed by: INTERNAL MEDICINE

## 2020-11-09 NOTE — RESULT ENCOUNTER NOTE
Dear Jessie,   Your recent test result are within acceptable range or at baseline. Please continue with your current plan of care.       Please call or Mychart to our office if you have further questions.     Kasia Dias MD-PhD

## 2020-11-10 ENCOUNTER — ALLIED HEALTH/NURSE VISIT (OUTPATIENT)
Dept: PEDIATRICS | Facility: CLINIC | Age: 50
End: 2020-11-10
Payer: COMMERCIAL

## 2020-11-10 DIAGNOSIS — Z23 NEED FOR VACCINATION: Primary | ICD-10-CM

## 2020-11-10 PROCEDURE — 99207 PR NO CHARGE NURSE ONLY: CPT

## 2020-11-10 PROCEDURE — 90471 IMMUNIZATION ADMIN: CPT

## 2020-11-10 PROCEDURE — 99207 PR NO CHARGE LOS: CPT

## 2020-11-10 PROCEDURE — 90750 HZV VACC RECOMBINANT IM: CPT

## 2020-11-10 NOTE — PROGRESS NOTES
Prior to immunization administration, verified patients identity using patient s name and date of birth. Please see Immunization Activity for additional information.     Screening Questionnaire for Adult Immunization    Are you sick today?   No   Do you have allergies to medications, food, a vaccine component or latex?   No   Have you ever had a serious reaction after receiving a vaccination?   No   Do you have a long-term health problem with heart, lung, kidney, or metabolic disease (e.g., diabetes), asthma, a blood disorder, no spleen, complement component deficiency, a cochlear implant, or a spinal fluid leak?  Are you on long-term aspirin therapy?   Yes   Do you have cancer, leukemia, HIV/AIDS, or any other immune system problem?   No   Do you have a parent, brother, or sister with an immune system problem?   No   In the past 3 months, have you taken medications that affect  your immune system, such as prednisone, other steroids, or anticancer drugs; drugs for the treatment of rheumatoid arthritis, Crohn s disease, or psoriasis; or have you had radiation treatments?   No   Have you had a seizure, or a brain or other nervous system problem?   No   During the past year, have you received a transfusion of blood or blood    products, or been given immune (gamma) globulin or antiviral drug?   No   For women: Are you pregnant or is there a chance you could become       pregnant during the next month?   No   Have you received any vaccinations in the past 4 weeks?   No     Immunization questionnaire was positive for at least one answer.  Notified Dr. Dacosta.        Per orders of Dr. Dacosta, injection of Shingrix given by Zenaida Arciniega CMA. Patient instructed to remain in clinic for 15 minutes afterwards, and to report any adverse reaction to me immediately.       Screening performed by Zenaida Arciniega CMA on 11/10/2020 at 12:58 PM.

## 2021-03-26 ENCOUNTER — ALLIED HEALTH/NURSE VISIT (OUTPATIENT)
Dept: NURSING | Facility: CLINIC | Age: 51
End: 2021-03-26
Payer: COMMERCIAL

## 2021-03-26 DIAGNOSIS — Z23 NEED FOR VACCINATION: Primary | ICD-10-CM

## 2021-03-26 PROCEDURE — 90750 HZV VACC RECOMBINANT IM: CPT

## 2021-03-26 PROCEDURE — 90471 IMMUNIZATION ADMIN: CPT

## 2021-03-26 PROCEDURE — 99207 PR NO CHARGE NURSE ONLY: CPT

## 2021-03-26 NOTE — NURSING NOTE
Prior to immunization administration, verified patients identity using patient s name and date of birth. Please see Immunization Activity for additional information.     Screening Questionnaire for Adult Immunization    Are you sick today?   No   Do you have allergies to medications, food, a vaccine component or latex?   No   Have you ever had a serious reaction after receiving a vaccination?   No   Do you have a long-term health problem with heart, lung, kidney, or metabolic disease (e.g., diabetes), asthma, a blood disorder, no spleen, complement component deficiency, a cochlear implant, or a spinal fluid leak?  Are you on long-term aspirin therapy?   No   Do you have cancer, leukemia, HIV/AIDS, or any other immune system problem?   No   Do you have a parent, brother, or sister with an immune system problem?   No   In the past 3 months, have you taken medications that affect  your immune system, such as prednisone, other steroids, or anticancer drugs; drugs for the treatment of rheumatoid arthritis, Crohn s disease, or psoriasis; or have you had radiation treatments?   No   Have you had a seizure, or a brain or other nervous system problem?   No   During the past year, have you received a transfusion of blood or blood    products, or been given immune (gamma) globulin or antiviral drug?   No   For women: Are you pregnant or is there a chance you could become       pregnant during the next month?   No   Have you received any vaccinations in the past 4 weeks?   No     Immunization questionnaire answers were all negative.        Per orders of Dr. Dias, injection of Shingrix given by Tomeka Cornejo. Patient instructed to remain in clinic for 15 minutes afterwards, and to report any adverse reaction to me immediately.       Screening performed by Tomeka Cornejo on 3/26/2021 at 12:11 PM.

## 2021-03-30 ENCOUNTER — OFFICE VISIT (OUTPATIENT)
Dept: PODIATRY | Facility: CLINIC | Age: 51
End: 2021-03-30
Payer: COMMERCIAL

## 2021-03-30 ENCOUNTER — ANCILLARY PROCEDURE (OUTPATIENT)
Dept: GENERAL RADIOLOGY | Facility: CLINIC | Age: 51
End: 2021-03-30
Attending: PODIATRIST
Payer: COMMERCIAL

## 2021-03-30 VITALS — DIASTOLIC BLOOD PRESSURE: 76 MMHG | SYSTOLIC BLOOD PRESSURE: 125 MMHG | HEART RATE: 51 BPM

## 2021-03-30 DIAGNOSIS — M77.8 CAPSULITIS OF LEFT FOOT: Primary | ICD-10-CM

## 2021-03-30 PROCEDURE — 73630 X-RAY EXAM OF FOOT: CPT | Mod: LT | Performed by: RADIOLOGY

## 2021-03-30 PROCEDURE — 99203 OFFICE O/P NEW LOW 30 MIN: CPT | Performed by: PODIATRIST

## 2021-03-30 NOTE — LETTER
3/30/2021         RE: Jessie Candelario  [address]  [place] [address]        Dear Colleague,    Thank you for referring your patient, Jessie Candelario, to the [facility]. Please see a copy of my visit note below.    This 51yo W has 2 year history of using bilateral feet orthotics for plantar fascitis. Most days has no pain in feet, except when working or if running often. Spends bulk of work day standing, walking. Works as ICU nurse, half days of month. On days where she does not work, goes for runs (3-5 miles). Would like to run marathon in October.  Resting, stretching and rolling ball under arches is effective for pain relief. Pain is mostly at L lateral forefoot. R foot gets achy but not intense pain. Has arthritis in neck and knee. Mom has arthritis.   ABHISHEK JACOBSON on 3/30/2021 at 9:36 AM     Attestation signed by Hiren Stout MD at 3/31/2021 12:49 PM:  Agreed. Dr stuot    Past Medical History:   Diagnosis Date     ASCUS favoring benign 10/22/12    neg HR HPV (70)     Endometrial polyp 12/7/2016     Exercise-induced asthma 1/16/2014     H/O colposcopy with cervical biopsy 12/19/12    no bx taken.      IUD (intrauterine device) in place 10/24/2012     LSIL (low grade squamous intraepithelial lesion) on Pap smear 3/21/14    neg HPV     PONV (postoperative nausea and vomiting)      Submucous leiomyoma of uterus 12/7/2016     Patient Active Problem List   Diagnosis     CARDIOVASCULAR SCREENING; LDL GOAL LESS THAN 160     TMJ (temporomandibular joint syndrome)     ASCUS with positive high risk HPV     Exercise-induced asthma     Dense breast     Del Rosario's neuralgia, left     Plantar fasciitis of left foot     Past Surgical History:   Procedure Laterality Date     ARTHROSCOPY KNEE Left 3/3/2015    Procedure: ARTHROSCOPY KNEE;  Surgeon: Marcos Shen MD;  [facility]: [facility]     C HAND/FINGER SURGERY UNLISTED       COLPOSCOPY CERVIX, BIOPSY  CERVIX, ENDOCERVICAL CURETTAGE, COMBINED       DILATE CERVIX, ABLATE ENDOMETRIUM NOVASURE, COMBINED N/A 2/3/2017    Procedure: COMBINED DILATE CERVIX, ABLATE ENDOMETRIUM NOVASURE;  Surgeon: Marcos Sierra MD;  Location: [facility]     HERNIA REPAIR  07     HYSTEROSCOPIC PLACEMENT CONTRACEPTIVE DEVICE  2014    Procedure: HYSTEROSCOPIC TUBAL LIGATION;  Hysteroscopic tubal ligation with Essure;  Surgeon: Marcos Sierra MD;  Location: [facility]     OPERATIVE HYSTEROSCOPY WITH MORCELLATOR N/A 2/3/2017    Procedure: OPERATIVE HYSTEROSCOPY WITH MORCELLATOR (MYOSURE);  Surgeon: Marcos Sierra MD;  Location: [facility]     Social History     Socioeconomic History     Marital status:      Spouse name: Not on file     Number of children: Not on file     Years of education: Not on file     Highest education level: Not on file   Occupational History     Not on file   Social Needs     Financial resource strain: Not on file     Food insecurity     Worry: Not on file     Inability: Not on file     Transportation needs     Medical: Not on file     Non-medical: Not on file   Tobacco Use     Smoking status: Former Smoker     Packs/day: 0.50     Types: Cigarettes     Quit date: 1998     Years since quittin.2     Smokeless tobacco: Never Used   Substance and Sexual Activity     Alcohol use: Yes     Comment: wine 6-7 glasses  per week     Drug use: No     Sexual activity: Yes     Partners: Male   Lifestyle     Physical activity     Days per week: Not on file     Minutes per session: Not on file     Stress: Not on file   Relationships     Social connections     Talks on phone: Not on file     Gets together: Not on file     Attends Holiness service: Not on file     Active member of club or organization: Not on file     Attends meetings of clubs or organizations: Not on file     Relationship status: Not on file     Intimate partner violence     Fear of current or ex partner: Not on file     Emotionally abused: Not on  file     Physically abused: Not on file     Forced sexual activity: Not on file   Other Topics Concern     Parent/sibling w/ CABG, MI or angioplasty before 65F 55M? Not Asked   Social History Narrative     Not on file     Family History   Problem Relation Age of Onset     Heart Disease Mother      Hypertension Mother      Alcohol/Drug Brother         alcoholic     Cancer Maternal Grandmother         melanoma     Heart Disease Maternal Grandfather         arrythemia     Heart Disease Paternal Grandfather      Alcohol/Drug Brother         alcoholic     SUBJECTIVE FINDINGS:  A 50-year-old female presents for left foot pain.  She relates it has been going on and off for a couple of years.  She relates she is running currently, and she is training for a marathon, so she wanted to make sure there is nothing else going on.  She is on her feet at work.  She did get orthotics, this last pair she has had for about a year.  She is wondering if those are holding out.  She just uses them when she runs, though.  She does not use them during the day.  She uses Birkenstock sandals at work.  No specific injury.  It hurts, she relates, on the plantar fifth ray of the left foot.  She had seen Dr. Putnam, and I reviewed his pre 06/02/2019 notes.  It looks like she was seen for left foot plantar fasciitis and Del Rosario's neuroma.  I also reviewed the x-rays from 03/06/2019 and compared those to today's x-rays.      OBJECTIVE FINDINGS:  DP and PT are 2/4 left.  She has pain on palpation, just proximal to the left fifth MPJ.  There is no palpable click or shooting pain in the interspaces.  There is no erythema, no drainage, no odor, no calor, no gross tendon voids.  She has mild dorsally contracted digits.  She has functional hallux limitus with some minimal dorsal first MPJ prominence.  X-rays reviewed with patient in clinic today.  There are no fractures.  Joint and cortical margins are intact.  She has a plantar calcaneal spurring  ossicle on the posterior talus, some joint space narrowing and spurring on the dorsal talonavicular joint and minimally on the anterior ankle joint.  She has elevated first metatarsal, more so on her previous x-rays.  There is increased calcaneal inclination noted.      ASSESSMENT AND PLAN:  Capsulitis, left fifth MPJ.  She does have a history of plantar fasciitis and neuroma.  Her orthotics appear to fit well.  The metatarsal pad appears to be flattening.  I added a metatarsal pad to her left orthotic and use discussed with her.  There are no gross fractures.  I will see her back in about 2 weeks.  The plan would be to adjust the orthotics as needed.  I advised her to wear orthotics all day.  Diagnosis and treatment options discussed with the patient.           Again, thank you for allowing me to participate in the care of your patient.        Sincerely,        Kobe Amador DPM

## 2021-03-30 NOTE — PROGRESS NOTES
This 49yo W has 2 year history of using bilateral feet orthotics for plantar fascitis. Most days has no pain in feet, except when working or if running often. Spends bulk of work day standing, walking. Works as ICU nurse, half days of month. On days where she does not work, goes for runs (3-5 miles). Would like to run marathon in October.  Resting, stretching and rolling ball under arches is effective for pain relief. Pain is mostly at L lateral forefoot. R foot gets achy but not intense pain. Has arthritis in neck and knee. Mom has arthritis.   ABHISHEK JACOBSON on 3/30/2021 at 9:36 AM

## 2021-03-30 NOTE — PROGRESS NOTES
Past Medical History:   Diagnosis Date     ASCUS favoring benign 10/22/12    neg HR HPV (70)     Endometrial polyp 12/7/2016     Exercise-induced asthma 1/16/2014     H/O colposcopy with cervical biopsy 12/19/12    no bx taken.      IUD (intrauterine device) in place 10/24/2012     LSIL (low grade squamous intraepithelial lesion) on Pap smear 3/21/14    neg HPV     PONV (postoperative nausea and vomiting)      Submucous leiomyoma of uterus 12/7/2016     Patient Active Problem List   Diagnosis     CARDIOVASCULAR SCREENING; LDL GOAL LESS THAN 160     TMJ (temporomandibular joint syndrome)     ASCUS with positive high risk HPV     Exercise-induced asthma     Dense breast     Del Rosario's neuralgia, left     Plantar fasciitis of left foot     Past Surgical History:   Procedure Laterality Date     ARTHROSCOPY KNEE Left 3/3/2015    Procedure: ARTHROSCOPY KNEE;  Surgeon: Marcos Shen MD;  Location:  OR      HAND/FINGER SURGERY UNLISTED       COLPOSCOPY CERVIX, BIOPSY CERVIX, ENDOCERVICAL CURETTAGE, COMBINED       DILATE CERVIX, ABLATE ENDOMETRIUM NOVASURE, COMBINED N/A 2/3/2017    Procedure: COMBINED DILATE CERVIX, ABLATE ENDOMETRIUM NOVASURE;  Surgeon: Marcos Sierra MD;  Location:  OR     HERNIA REPAIR  2/5/07     HYSTEROSCOPIC PLACEMENT CONTRACEPTIVE DEVICE  2/7/2014    Procedure: HYSTEROSCOPIC TUBAL LIGATION;  Hysteroscopic tubal ligation with Essure;  Surgeon: Marcos Sierra MD;  Location:  OR     OPERATIVE HYSTEROSCOPY WITH MORCELLATOR N/A 2/3/2017    Procedure: OPERATIVE HYSTEROSCOPY WITH MORCELLATOR (MYOSURE);  Surgeon: Marcos Sierra MD;  Location:  OR     Social History     Socioeconomic History     Marital status:      Spouse name: Not on file     Number of children: Not on file     Years of education: Not on file     Highest education level: Not on file   Occupational History     Not on file   Social Needs     Financial resource strain: Not on file     Food insecurity      Worry: Not on file     Inability: Not on file     Transportation needs     Medical: Not on file     Non-medical: Not on file   Tobacco Use     Smoking status: Former Smoker     Packs/day: 0.50     Types: Cigarettes     Quit date: 1998     Years since quittin.2     Smokeless tobacco: Never Used   Substance and Sexual Activity     Alcohol use: Yes     Comment: wine 6-7 glasses  per week     Drug use: No     Sexual activity: Yes     Partners: Male   Lifestyle     Physical activity     Days per week: Not on file     Minutes per session: Not on file     Stress: Not on file   Relationships     Social connections     Talks on phone: Not on file     Gets together: Not on file     Attends Muslim service: Not on file     Active member of club or organization: Not on file     Attends meetings of clubs or organizations: Not on file     Relationship status: Not on file     Intimate partner violence     Fear of current or ex partner: Not on file     Emotionally abused: Not on file     Physically abused: Not on file     Forced sexual activity: Not on file   Other Topics Concern     Parent/sibling w/ CABG, MI or angioplasty before 65F 55M? Not Asked   Social History Narrative     Not on file     Family History   Problem Relation Age of Onset     Heart Disease Mother      Hypertension Mother      Alcohol/Drug Brother         alcoholic     Cancer Maternal Grandmother         melanoma     Heart Disease Maternal Grandfather         arrythemia     Heart Disease Paternal Grandfather      Alcohol/Drug Brother         alcoholic     SUBJECTIVE FINDINGS:  A 50-year-old female presents for left foot pain.  She relates it has been going on and off for a couple of years.  She relates she is running currently, and she is training for a marathon, so she wanted to make sure there is nothing else going on.  She is on her feet at work.  She did get orthotics, this last pair she has had for about a year.  She is wondering if those are  holding out.  She just uses them when she runs, though.  She does not use them during the day.  She uses Birkenstock sandals at work.  No specific injury.  It hurts, she relates, on the plantar fifth ray of the left foot.  She had seen Dr. Putnam, and I reviewed his pre 06/02/2019 notes.  It looks like she was seen for left foot plantar fasciitis and Del Rosario's neuroma.  I also reviewed the x-rays from 03/06/2019 and compared those to today's x-rays.      OBJECTIVE FINDINGS:  DP and PT are 2/4 left.  She has pain on palpation, just proximal to the left fifth MPJ.  There is no palpable click or shooting pain in the interspaces.  There is no erythema, no drainage, no odor, no calor, no gross tendon voids.  She has mild dorsally contracted digits.  She has functional hallux limitus with some minimal dorsal first MPJ prominence.  X-rays reviewed with patient in clinic today.  There are no fractures.  Joint and cortical margins are intact.  She has a plantar calcaneal spurring ossicle on the posterior talus, some joint space narrowing and spurring on the dorsal talonavicular joint and minimally on the anterior ankle joint.  She has elevated first metatarsal, more so on her previous x-rays.  There is increased calcaneal inclination noted.      ASSESSMENT AND PLAN:  Capsulitis, left fifth MPJ.  She does have a history of plantar fasciitis and neuroma.  Her orthotics appear to fit well.  The metatarsal pad appears to be flattening.  I added a metatarsal pad to her left orthotic and use discussed with her.  There are no gross fractures.  I will see her back in about 2 weeks.  The plan would be to adjust the orthotics as needed.  I advised her to wear orthotics all day.  Diagnosis and treatment options discussed with the patient.

## 2021-03-30 NOTE — PATIENT INSTRUCTIONS
Thanks for coming today.  [facility]  [address] [place] [address]    To schedule future appointments in Ortho Clinic, you may call [phone].    To schedule ordered imaging by your provider:   Call Central [facility] [phone]    To schedule an injection ordered by your provider:  Call [facility] Injection scheduling line: [phone]  [facility] available online at:  [email or web address]    Please call if any further questions or concerns ([phone]).  Clinic hours 8 am to 5 pm.    Return to clinic (call) if symptoms worsen or fail to improve.

## 2021-07-08 DIAGNOSIS — Z12.31 VISIT FOR SCREENING MAMMOGRAM: ICD-10-CM

## 2021-07-08 PROCEDURE — 77063 BREAST TOMOSYNTHESIS BI: CPT | Mod: TC | Performed by: RADIOLOGY

## 2021-07-08 PROCEDURE — 77067 SCR MAMMO BI INCL CAD: CPT | Mod: TC | Performed by: RADIOLOGY

## 2021-08-26 ENCOUNTER — LAB (OUTPATIENT)
Dept: LAB | Facility: CLINIC | Age: 51
End: 2021-08-26
Payer: COMMERCIAL

## 2021-08-26 ENCOUNTER — OFFICE VISIT (OUTPATIENT)
Dept: PEDIATRICS | Facility: CLINIC | Age: 51
End: 2021-08-26
Payer: COMMERCIAL

## 2021-08-26 ENCOUNTER — VIRTUAL VISIT (OUTPATIENT)
Dept: FAMILY MEDICINE | Facility: CLINIC | Age: 51
End: 2021-08-26
Payer: COMMERCIAL

## 2021-08-26 ENCOUNTER — ANCILLARY PROCEDURE (OUTPATIENT)
Dept: CT IMAGING | Facility: CLINIC | Age: 51
End: 2021-08-26
Attending: PHYSICIAN ASSISTANT
Payer: COMMERCIAL

## 2021-08-26 ENCOUNTER — HOSPITAL ENCOUNTER (INPATIENT)
Facility: CLINIC | Age: 51
LOS: 1 days | Discharge: HOME | DRG: 343 | End: 2021-08-27
Attending: EMERGENCY MEDICINE | Admitting: SURGERY
Payer: COMMERCIAL

## 2021-08-26 VITALS
HEART RATE: 67 BPM | OXYGEN SATURATION: 100 % | TEMPERATURE: 97 F | SYSTOLIC BLOOD PRESSURE: 125 MMHG | DIASTOLIC BLOOD PRESSURE: 74 MMHG | RESPIRATION RATE: 16 BRPM

## 2021-08-26 DIAGNOSIS — K35.30 ACUTE APPENDICITIS WITH LOCALIZED PERITONITIS, UNSPECIFIED WHETHER ABSCESS PRESENT, UNSPECIFIED WHETHER GANGRENE PRESENT, UNSPECIFIED WHETHER PERFORATION PRESENT: ICD-10-CM

## 2021-08-26 DIAGNOSIS — R79.82 CRP ELEVATED: ICD-10-CM

## 2021-08-26 DIAGNOSIS — R10.31 RLQ ABDOMINAL PAIN: Primary | ICD-10-CM

## 2021-08-26 DIAGNOSIS — D72.829 LEUKOCYTOSIS, UNSPECIFIED TYPE: ICD-10-CM

## 2021-08-26 DIAGNOSIS — Z11.52 ENCOUNTER FOR SCREENING LABORATORY TESTING FOR SEVERE ACUTE RESPIRATORY SYNDROME CORONAVIRUS 2 (SARS-COV-2): ICD-10-CM

## 2021-08-26 DIAGNOSIS — K35.32 ACUTE APPENDICITIS WITH RUPTURE: ICD-10-CM

## 2021-08-26 DIAGNOSIS — R10.31 RLQ ABDOMINAL PAIN: ICD-10-CM

## 2021-08-26 LAB
ALBUMIN SERPL-MCNC: 3.4 G/DL (ref 3.4–5)
ALBUMIN UR-MCNC: NEGATIVE MG/DL
ALP SERPL-CCNC: 42 U/L (ref 40–150)
ALT SERPL W P-5'-P-CCNC: 18 U/L (ref 0–50)
ANION GAP SERPL CALCULATED.3IONS-SCNC: 2 MMOL/L (ref 3–14)
APPEARANCE UR: CLEAR
AST SERPL W P-5'-P-CCNC: 16 U/L (ref 0–45)
BASOPHILS # BLD AUTO: 0 10E3/UL (ref 0–0.2)
BASOPHILS # BLD AUTO: 0.1 10E3/UL (ref 0–0.2)
BASOPHILS NFR BLD AUTO: 0 %
BASOPHILS NFR BLD AUTO: 1 %
BILIRUB SERPL-MCNC: 0.5 MG/DL (ref 0.2–1.3)
BILIRUB UR QL STRIP: NEGATIVE
BUN SERPL-MCNC: 10 MG/DL (ref 7–30)
CALCIUM SERPL-MCNC: 8.9 MG/DL (ref 8.5–10.1)
CHLORIDE BLD-SCNC: 106 MMOL/L (ref 94–109)
CO2 SERPL-SCNC: 28 MMOL/L (ref 20–32)
COLOR UR AUTO: ABNORMAL
CREAT SERPL-MCNC: 0.82 MG/DL (ref 0.52–1.04)
CRP SERPL-MCNC: 155 MG/L (ref 0–8)
EOSINOPHIL # BLD AUTO: 0.2 10E3/UL (ref 0–0.7)
EOSINOPHIL # BLD AUTO: 0.2 10E3/UL (ref 0–0.7)
EOSINOPHIL NFR BLD AUTO: 1 %
EOSINOPHIL NFR BLD AUTO: 2 %
ERYTHROCYTE [DISTWIDTH] IN BLOOD BY AUTOMATED COUNT: 12.4 % (ref 10–15)
ERYTHROCYTE [DISTWIDTH] IN BLOOD BY AUTOMATED COUNT: 12.5 % (ref 10–15)
GFR SERPL CREATININE-BSD FRML MDRD: 84 ML/MIN/1.73M2
GLUCOSE BLD-MCNC: 85 MG/DL (ref 70–99)
GLUCOSE UR STRIP-MCNC: NEGATIVE MG/DL
HCT VFR BLD AUTO: 38.1 % (ref 35–47)
HCT VFR BLD AUTO: 39.4 % (ref 35–47)
HGB BLD-MCNC: 12.9 G/DL (ref 11.7–15.7)
HGB BLD-MCNC: 13.6 G/DL (ref 11.7–15.7)
HGB UR QL STRIP: NEGATIVE
HOLD SPECIMEN: NORMAL
HOLD SPECIMEN: NORMAL
IMM GRANULOCYTES # BLD: 0.1 10E3/UL
IMM GRANULOCYTES # BLD: 0.1 10E3/UL
IMM GRANULOCYTES NFR BLD: 0 %
IMM GRANULOCYTES NFR BLD: 1 %
KETONES UR STRIP-MCNC: NEGATIVE MG/DL
LEUKOCYTE ESTERASE UR QL STRIP: NEGATIVE
LYMPHOCYTES # BLD AUTO: 1 10E3/UL (ref 0.8–5.3)
LYMPHOCYTES # BLD AUTO: 1.3 10E3/UL (ref 0.8–5.3)
LYMPHOCYTES NFR BLD AUTO: 11 %
LYMPHOCYTES NFR BLD AUTO: 8 %
MCH RBC QN AUTO: 30.7 PG (ref 26.5–33)
MCH RBC QN AUTO: 31.2 PG (ref 26.5–33)
MCHC RBC AUTO-ENTMCNC: 33.9 G/DL (ref 31.5–36.5)
MCHC RBC AUTO-ENTMCNC: 34.5 G/DL (ref 31.5–36.5)
MCV RBC AUTO: 90 FL (ref 78–100)
MCV RBC AUTO: 91 FL (ref 78–100)
MONOCYTES # BLD AUTO: 0.5 10E3/UL (ref 0–1.3)
MONOCYTES # BLD AUTO: 0.6 10E3/UL (ref 0–1.3)
MONOCYTES NFR BLD AUTO: 4 %
MONOCYTES NFR BLD AUTO: 5 %
NEUTROPHILS # BLD AUTO: 10 10E3/UL (ref 1.6–8.3)
NEUTROPHILS # BLD AUTO: 11.4 10E3/UL (ref 1.6–8.3)
NEUTROPHILS NFR BLD AUTO: 81 %
NEUTROPHILS NFR BLD AUTO: 86 %
NITRATE UR QL: NEGATIVE
NRBC # BLD AUTO: 0 10E3/UL
NRBC # BLD AUTO: 0 10E3/UL
NRBC BLD AUTO-RTO: 0 /100
NRBC BLD AUTO-RTO: 0 /100
PH UR STRIP: 5.5 [PH] (ref 5–7)
PLATELET # BLD AUTO: 245 10E3/UL (ref 150–450)
PLATELET # BLD AUTO: 261 10E3/UL (ref 150–450)
POTASSIUM BLD-SCNC: 3.6 MMOL/L (ref 3.4–5.3)
PROT SERPL-MCNC: 7.3 G/DL (ref 6.8–8.8)
RADIOLOGIST FLAGS: ABNORMAL
RBC # BLD AUTO: 4.2 10E6/UL (ref 3.8–5.2)
RBC # BLD AUTO: 4.36 10E6/UL (ref 3.8–5.2)
RBC #/AREA URNS AUTO: NORMAL /HPF
SARS-COV-2 RNA RESP QL NAA+PROBE: NEGATIVE
SODIUM SERPL-SCNC: 136 MMOL/L (ref 133–144)
SP GR UR STRIP: 1 (ref 1–1.03)
UROBILINOGEN UR STRIP-MCNC: NORMAL MG/DL
WBC # BLD AUTO: 12.2 10E3/UL (ref 4–11)
WBC # BLD AUTO: 13.2 10E3/UL (ref 4–11)
WBC #/AREA URNS AUTO: NORMAL /HPF

## 2021-08-26 PROCEDURE — 85025 COMPLETE CBC W/AUTO DIFF WBC: CPT

## 2021-08-26 PROCEDURE — 96376 TX/PRO/DX INJ SAME DRUG ADON: CPT

## 2021-08-26 PROCEDURE — 250N000011 HC RX IP 250 OP 636: Performed by: EMERGENCY MEDICINE

## 2021-08-26 PROCEDURE — 96361 HYDRATE IV INFUSION ADD-ON: CPT

## 2021-08-26 PROCEDURE — C9803 HOPD COVID-19 SPEC COLLECT: HCPCS

## 2021-08-26 PROCEDURE — 36415 COLL VENOUS BLD VENIPUNCTURE: CPT

## 2021-08-26 PROCEDURE — 96374 THER/PROPH/DIAG INJ IV PUSH: CPT

## 2021-08-26 PROCEDURE — 86140 C-REACTIVE PROTEIN: CPT

## 2021-08-26 PROCEDURE — 99285 EMERGENCY DEPT VISIT HI MDM: CPT | Performed by: EMERGENCY MEDICINE

## 2021-08-26 PROCEDURE — 99207 PR INPT ADMISSION FROM CLINIC: CPT | Performed by: PHYSICIAN ASSISTANT

## 2021-08-26 PROCEDURE — 99213 OFFICE O/P EST LOW 20 MIN: CPT | Mod: 95 | Performed by: INTERNAL MEDICINE

## 2021-08-26 PROCEDURE — 99285 EMERGENCY DEPT VISIT HI MDM: CPT | Mod: 25

## 2021-08-26 PROCEDURE — 74177 CT ABD & PELVIS W/CONTRAST: CPT | Mod: GC | Performed by: RADIOLOGY

## 2021-08-26 PROCEDURE — 258N000003 HC RX IP 258 OP 636: Performed by: EMERGENCY MEDICINE

## 2021-08-26 PROCEDURE — 36415 COLL VENOUS BLD VENIPUNCTURE: CPT | Performed by: EMERGENCY MEDICINE

## 2021-08-26 PROCEDURE — 80053 COMPREHEN METABOLIC PANEL: CPT | Performed by: EMERGENCY MEDICINE

## 2021-08-26 PROCEDURE — 85025 COMPLETE CBC W/AUTO DIFF WBC: CPT | Performed by: EMERGENCY MEDICINE

## 2021-08-26 PROCEDURE — 81001 URINALYSIS AUTO W/SCOPE: CPT

## 2021-08-26 PROCEDURE — U0003 INFECTIOUS AGENT DETECTION BY NUCLEIC ACID (DNA OR RNA); SEVERE ACUTE RESPIRATORY SYNDROME CORONAVIRUS 2 (SARS-COV-2) (CORONAVIRUS DISEASE [COVID-19]), AMPLIFIED PROBE TECHNIQUE, MAKING USE OF HIGH THROUGHPUT TECHNOLOGIES AS DESCRIBED BY CMS-2020-01-R: HCPCS | Performed by: EMERGENCY MEDICINE

## 2021-08-26 PROCEDURE — 120N000002 HC R&B MED SURG/OB UMMC

## 2021-08-26 RX ORDER — TRETINOIN 0.25 MG/G
CREAM TOPICAL
COMMUNITY
Start: 2021-02-10

## 2021-08-26 RX ORDER — SODIUM CHLORIDE 9 MG/ML
INJECTION, SOLUTION INTRAVENOUS CONTINUOUS
Status: DISCONTINUED | OUTPATIENT
Start: 2021-08-26 | End: 2021-08-27 | Stop reason: HOSPADM

## 2021-08-26 RX ORDER — HYDROMORPHONE HYDROCHLORIDE 1 MG/ML
0.5 INJECTION, SOLUTION INTRAMUSCULAR; INTRAVENOUS; SUBCUTANEOUS
Status: DISCONTINUED | OUTPATIENT
Start: 2021-08-26 | End: 2021-08-27

## 2021-08-26 RX ORDER — HYDROMORPHONE HYDROCHLORIDE 1 MG/ML
0.5 INJECTION, SOLUTION INTRAMUSCULAR; INTRAVENOUS; SUBCUTANEOUS ONCE
Status: COMPLETED | OUTPATIENT
Start: 2021-08-26 | End: 2021-08-26

## 2021-08-26 RX ORDER — IOPAMIDOL 755 MG/ML
93 INJECTION, SOLUTION INTRAVASCULAR ONCE
Status: COMPLETED | OUTPATIENT
Start: 2021-08-26 | End: 2021-08-26

## 2021-08-26 RX ORDER — PROMETHAZINE HYDROCHLORIDE 25 MG/1
1 TABLET ORAL DAILY
COMMUNITY

## 2021-08-26 RX ORDER — PIPERACILLIN SODIUM, TAZOBACTAM SODIUM 3; .375 G/15ML; G/15ML
3.38 INJECTION, POWDER, LYOPHILIZED, FOR SOLUTION INTRAVENOUS ONCE
Status: COMPLETED | OUTPATIENT
Start: 2021-08-26 | End: 2021-08-26

## 2021-08-26 RX ADMIN — HYDROMORPHONE HYDROCHLORIDE 0.5 MG: 1 INJECTION, SOLUTION INTRAMUSCULAR; INTRAVENOUS; SUBCUTANEOUS at 18:59

## 2021-08-26 RX ADMIN — SODIUM CHLORIDE: 900 INJECTION, SOLUTION INTRAVENOUS at 20:43

## 2021-08-26 RX ADMIN — HYDROMORPHONE HYDROCHLORIDE 0.5 MG: 1 INJECTION, SOLUTION INTRAMUSCULAR; INTRAVENOUS; SUBCUTANEOUS at 23:45

## 2021-08-26 RX ADMIN — HYDROMORPHONE HYDROCHLORIDE 0.5 MG: 1 INJECTION, SOLUTION INTRAMUSCULAR; INTRAVENOUS; SUBCUTANEOUS at 20:56

## 2021-08-26 RX ADMIN — IOPAMIDOL 93 ML: 755 INJECTION, SOLUTION INTRAVASCULAR at 14:49

## 2021-08-26 RX ADMIN — PIPERACILLIN SODIUM AND TAZOBACTAM SODIUM 3.38 G: 3; .375 INJECTION, POWDER, LYOPHILIZED, FOR SOLUTION INTRAVENOUS at 19:58

## 2021-08-26 RX ADMIN — SODIUM CHLORIDE 1000 ML: 9 INJECTION, SOLUTION INTRAVENOUS at 19:26

## 2021-08-26 ASSESSMENT — ENCOUNTER SYMPTOMS
DIARRHEA: 1
CHILLS: 0
ABDOMINAL PAIN: 1
SHORTNESS OF BREATH: 0
DIFFICULTY URINATING: 0
COUGH: 0
DIZZINESS: 0
APPETITE CHANGE: 1
VOMITING: 0
LIGHT-HEADEDNESS: 0
HEMATOCHEZIA: 0
FATIGUE: 1
FEVER: 0
NAUSEA: 1
DYSURIA: 0
FLANK PAIN: 1

## 2021-08-26 ASSESSMENT — MIFFLIN-ST. JEOR: SCORE: 1305.81

## 2021-08-26 ASSESSMENT — PAIN SCALES - GENERAL: PAINLEVEL: MILD PAIN (3)

## 2021-08-26 NOTE — PROGRESS NOTES
"      Subjective   Jessie is a 50 year old who presents for the following health issues: RLQ pain x 2 days, cramping, sharp pain that comes and goes. Loose stools and bloating. Seems better today than has last 2 days. Taking Ibuprofen \"around the clock\" for last couple days for pain control.  Virtual visit with primary care done today, patient sent to [facility] to rule out chelly vs diverticulitis, etc and CT scan.    HPI     Abdominal/Flank Pain  Onset/Duration: about 2 days ago  Description:   Character: Sharp and Cramping, tender to touch area  Location: right lower quadrant  Radiation: Right flank and low back  Intensity: ranges 0-7 on pain scale, comes and goes, depends on what patient is doing at that time  Progression of Symptoms:  May be improving today, better today than past 2 days and intermittent  Accompanying Signs & Symptoms:  Fever/Chills: no  Gas/Bloating: YES, definitely bloating  Nausea: no, just not much of appetite though  Vomitting: no  Diarrhea: looser stools, no BM yesterday. BM today  Constipation: no  Dysuria or Hematuria: no  History:   Trauma: no, but did see Chiropractor on Tuesday and had adjustment, did some deep tissue massage and had some acupuncture as well, flared up after this  Previous similar pain: no  Previous tests done: labs done today-see Epic chart  Precipitating factors:   Does the pain change with:     Food: hard to tell, food may affect the pain    Bowel Movement: no    Urination: helps relieve the pressure   Other factors:  no  Therapies tried and outcome: Ibuprofen and Tylenol have helped with the pain, hot baths with Epsom salt-helps relax patient and muscles    No LMP recorded. Patient has had an ablation.    Objective    /74 (BP Location: Left arm, Patient Position: Sitting, Cuff Size: Adult Regular)   Pulse 67   Temp 97  F (36.1  C) (Oral)   Resp 16   SpO2 100%   There is no height or weight on file to calculate BMI.    "

## 2021-08-26 NOTE — PROGRESS NOTES
Assessment & Plan     RLQ abdominal pain    - CT Abdomen Pelvis w Contrast    Leukocytosis, unspecified type    - CT Abdomen Pelvis w Contrast    Acute appendicitis with rupture       90 minutes spent on the date of the encounter doing chart review, review of outside records, review of test results, interpretation of tests, patient visit, documentation and discussion with family     Medical Decision Making: I reviewed previous virtual visit and discussed patient with Dr. Dias who sent patient to [facility]. Patient was found to have elevated WBC as well as elevated CRP on labs, which I also reviewed. We agreed patient needs a CT of abdomen and pelvis with contrast. DDx: appendicitis, diverticulitis, bowel perforation, hernia, urethral stone, nephrolithiasis, musculoskeletal pain. CT to my read shows enlarged appendix suggesting acute appendicitis which correlates to physical exam. Radiologist read appendicitis with perforation. Patient will travel via EMS for surgery. Patient stated she does not need pain medication at this time. IV fluids started due to history of decreased appetite to correct fluid status.     Patient to go to [facility]. ER Charge Nurse Joni notified of patient and given report. Patient traveling via [facility].       Subjective     Jessie is a 50 year old female who presents to [facility] today for the following health issues:  Chief Complaint   Patient presents with     Abdominal Pain     RLQ x 2 days     Jessie presents from a telephone visit with RLQ pain x 2 days. Pain ranges from no pain to 7/10. It can radiate to her right flank.  She was found to have elevated WBC as well as elevated CRP. She states she saw her chiropractor and has acupuncture Tuesday. The right lower quadrant was focused on during these sessions buts she did not have pain at the time. Tuesday she then develops abdominal pain, loose stools. She denies vomiting, blood in stool, fever. Last BM was  today. She reports some decreased in appetite. She has history of right inguinal hernia repair, uncomplicated.  She does not take daily medication except vitamins.    She has tried Ibuprofen and Tylenol for pain which offers some relief.           Review of Systems   Constitutional: Positive for appetite change and fatigue. Negative for chills and fever.   Respiratory: Negative for cough and shortness of breath.    Cardiovascular: Negative for chest pain.   Gastrointestinal: Positive for abdominal pain, diarrhea and nausea. Negative for hematochezia and vomiting.   Genitourinary: Positive for flank pain. Negative for difficulty urinating and dysuria.   Neurological: Negative for dizziness, syncope and light-headedness.       Patient Active Problem List    Diagnosis Date Noted     Del Rosario's neuralgia, left 03/06/2019     Priority: Medium     Plantar fasciitis of left foot 03/06/2019     Priority: Medium     Dense breast 05/20/2015     Priority: Medium     Annual mammogram       Exercise-induced asthma 01/16/2014     Priority: Medium     ASCUS with positive high risk HPV 10/31/2012     Priority: Medium     10/22/12 pap- ASCUS neg HR HPV (70). Plan-- colposcopy within 3 months  12/19/12 colposcopy. No bx taken. Plan-- Repeat Pap in 1 year.  3/21/14 pap LSIL/neg HR HPV. Plan: repeat cotesting in 1 year. (due 3/21/15)   5/20/15 pap NIL/neg HPV. Plan: repeat cotesting in 3 years. Due 5/20/16.   10/22/20 NIL pap, Neg HPV.  Plan cotest in 3 years.         TMJ (temporomandibular joint syndrome) 10/24/2012     Priority: Medium     CARDIOVASCULAR SCREENING; LDL GOAL LESS THAN 160 01/18/2011     Priority: Medium       Current Outpatient Medications   Medication     albuterol (VENTOLIN HFA) 108 (90 Base) MCG/ACT inhaler     co-enzyme Q-10 100 MG CAPS capsule     ibuprofen (ADVIL,MOTRIN) 200 MG tablet     Omega-3 Fatty Acids (FISH OIL) 1200 MG capsule     UNABLE TO FIND     No current facility-administered medications for this  visit.           Objective    /74 (BP Location: Left arm, Patient Position: Sitting, Cuff Size: Adult Regular)   Pulse 67   Temp 97  F (36.1  C) (Oral)   Resp 16   SpO2 100%   Physical Exam  Constitutional:       General: She is not in acute distress.     Appearance: Normal appearance. She is normal weight. She is not ill-appearing, toxic-appearing or diaphoretic.   HENT:      Head: Normocephalic and atraumatic.   Cardiovascular:      Rate and Rhythm: Normal rate and regular rhythm.      Heart sounds: Normal heart sounds.   Pulmonary:      Effort: Pulmonary effort is normal.      Breath sounds: Normal breath sounds.   Abdominal:      General: Abdomen is flat. Bowel sounds are normal.      Palpations: Abdomen is soft.      Tenderness: There is abdominal tenderness in the right lower quadrant. Positive signs include McBurney's sign. Negative signs include Iverson's sign.       Musculoskeletal:      Cervical back: Normal range of motion and neck supple.   Skin:     General: Skin is warm and dry.   Neurological:      Mental Status: She is alert and oriented to person, place, and time.   Psychiatric:         Mood and Affect: Mood normal.         Behavior: Behavior normal.         Thought Content: Thought content normal.         Judgment: Judgment normal.              Results for orders placed or performed in visit on 08/26/21   UA with Microscopic reflex to Culture - lab collect     Status: Abnormal    Specimen: Urine, Midstream   Result Value Ref Range    Color Urine Straw Colorless, Straw, Light Yellow, Yellow    Appearance Urine Clear Clear    Glucose Urine Negative Negative mg/dL    Bilirubin Urine Negative Negative    Ketones Urine Negative Negative mg/dL    Specific Gravity Urine 1.002 (L) 1.003 - 1.035    Blood Urine Negative Negative    pH Urine 5.5 5.0 - 7.0    Protein Albumin Urine Negative Negative mg/dL    Nitrite Urine Negative Negative    Leukocyte Esterase Urine Negative Negative    Urobilinogen  Urine Normal Normal, 2.0 mg/dL   CRP, inflammation     Status: Abnormal   Result Value Ref Range    CRP Inflammation 155.0 (H) 0.0 - 8.0 mg/L   CBC with platelets and differential     Status: Abnormal    Narrative    The following orders were created for panel order CBC with platelets and differential.  Procedure                               Abnormality         Status                     ---------                               -----------         ------                     CBC with platelets and d...[[id number]]  Abnormal            Final result                 Please view results for these tests on the individual orders.   CBC with platelets and differential     Status: Abnormal   Result Value Ref Range    WBC Count 12.2 (H) 4.0 - 11.0 10e3/uL    RBC Count 4.36 3.80 - 5.20 10e6/uL    Hemoglobin 13.6 11.7 - 15.7 g/dL    Hematocrit 39.4 35.0 - 47.0 %    MCV 90 78 - 100 fL    MCH 31.2 26.5 - 33.0 pg    MCHC 34.5 31.5 - 36.5 g/dL    RDW 12.4 10.0 - 15.0 %    Platelet Count 245 150 - 450 10e3/uL    % Neutrophils 81 %    % Lymphocytes 11 %    % Monocytes 5 %    % Eosinophils 2 %    % Basophils 1 %    % Immature Granulocytes 0 %    NRBCs per 100 WBC 0 <1 /100    Absolute Neutrophils 10.0 (H) 1.6 - 8.3 10e3/uL    Absolute Lymphocytes 1.3 0.8 - 5.3 10e3/uL    Absolute Monocytes 0.6 0.0 - 1.3 10e3/uL    Absolute Eosinophils 0.2 0.0 - 0.7 10e3/uL    Absolute Basophils 0.1 0.0 - 0.2 10e3/uL    Absolute Immature Granulocytes 0.1 (H) <=0.0 10e3/uL    Absolute NRBCs 0.0 10e3/uL   Extra Serum Separator Tube (SST)     Status: None   Result Value Ref Range    Hold Specimen [facility]    Urine Microscopic     Status: Normal   Result Value Ref Range    RBC Urine 0-2 0-2 /HPF /HPF    WBC Urine 0-5 0-5 /HPF /HPF    Narrative    Urine Culture not indicated   Extra Tube     Status: None    Narrative    The following orders were created for panel order Extra Tube.  Procedure                               Abnormality         Status                      ---------                               -----------         ------                     Extra Serum Separator Tu...[[id number]]                      Final result                 Please view results for these tests on the individual orders.                 Xavier Cabezas PA-C  [facility]

## 2021-08-26 NOTE — PROGRESS NOTES
Results for orders placed or performed in visit on 08/26/21   UA with Microscopic reflex to Culture - lab collect     Status: Abnormal    Specimen: Urine, Midstream   Result Value Ref Range    Color Urine Straw Colorless, Straw, Light Yellow, Yellow    Appearance Urine Clear Clear    Glucose Urine Negative Negative mg/dL    Bilirubin Urine Negative Negative    Ketones Urine Negative Negative mg/dL    Specific Gravity Urine 1.002 (L) 1.003 - 1.035    Blood Urine Negative Negative    pH Urine 5.5 5.0 - 7.0    Protein Albumin Urine Negative Negative mg/dL    Nitrite Urine Negative Negative    Leukocyte Esterase Urine Negative Negative    Urobilinogen Urine Normal Normal, 2.0 mg/dL   CRP, inflammation     Status: Abnormal   Result Value Ref Range    CRP Inflammation 155.0 (H) 0.0 - 8.0 mg/L   CBC with platelets and differential     Status: Abnormal    Narrative    The following orders were created for panel order CBC with platelets and differential.  Procedure                               Abnormality         Status                     ---------                               -----------         ------                     CBC with platelets and d...[[id number]]  Abnormal            Final result                 Please view results for these tests on the individual orders.   CBC with platelets and differential     Status: Abnormal   Result Value Ref Range    WBC Count 12.2 (H) 4.0 - 11.0 10e3/uL    RBC Count 4.36 3.80 - 5.20 10e6/uL    Hemoglobin 13.6 11.7 - 15.7 g/dL    Hematocrit 39.4 35.0 - 47.0 %    MCV 90 78 - 100 fL    MCH 31.2 26.5 - 33.0 pg    MCHC 34.5 31.5 - 36.5 g/dL    RDW 12.4 10.0 - 15.0 %    Platelet Count 245 150 - 450 10e3/uL    % Neutrophils 81 %    % Lymphocytes 11 %    % Monocytes 5 %    % Eosinophils 2 %    % Basophils 1 %    % Immature Granulocytes 0 %    NRBCs per 100 WBC 0 <1 /100    Absolute Neutrophils 10.0 (H) 1.6 - 8.3 10e3/uL    Absolute Lymphocytes 1.3 0.8 - 5.3 10e3/uL    Absolute Monocytes  0.6 0.0 - 1.3 10e3/uL    Absolute Eosinophils 0.2 0.0 - 0.7 10e3/uL    Absolute Basophils 0.1 0.0 - 0.2 10e3/uL    Absolute Immature Granulocytes 0.1 (H) <=0.0 10e3/uL    Absolute NRBCs 0.0 10e3/uL   Urine Microscopic     Status: Normal   Result Value Ref Range    RBC Urine 0-2 0-2 /HPF /HPF    WBC Urine 0-5 0-5 /HPF /HPF    Narrative    Urine Culture not indicated   Extra Tube     Status: None (In process)    Narrative    The following orders were created for panel order Extra Tube.  Procedure                               Abnormality         Status                     ---------                               -----------         ------                     Extra Serum Separator Tu...[[id number]]                      In process                   Please view results for these tests on the individual orders.       Referred to [facility] for evaluation for diverticulitis vs appendicitis. Gave report to [facility] provider and patient agreeable to go there.     I spent a total of 27 minutes on the day of the visit.  doing chart review, history and exam, documentation and further activities as noted above

## 2021-08-26 NOTE — PROGRESS NOTES
Jessie is a 50 year old who is being evaluated via a billable telephone visit.      What phone number would you like to be contacted at? [phone]  How would you like to obtain your AVS? MyChart    Assessment & Plan   Right lower quadrant abdominal pain associated with transient loose stool.  Recent chiropractic manipulation in the right lower quadrant.  Differential diagnosis includes diverticulitis appendicitis ureteral stone versus referred pain from psoas muscle manipulation.  Will obtain blood work and UA.  Further recommendation pending on test results.  Return for Pending on lab results.    Kasia Dias MD PhD  [facility]   Jessie is a 50 year old who presents for the following health issues     HPI   Tuesday started stomach pain. Had chiropractor and acupunture that day. Worked on the right psoas muscles, near the right groin area. She didn't feel it painful around the time of the chiropractor massage in the area. Later in the day, she had horrible belly pain, looser stool, horrific belly pain. Muscle of the abdomen is sore. No blood in the stool. No vomiting. Slight nausea on Tuesday. She did eat out that day, had shrimp taco.     Yesterday she didn't have BM at all.   Not eating much due to abdominal pain.   Today not loose stool or diarrhea.     Patient reported pain is worse when she presses on it.  Not when her hand let go    History of right inguinal hernia repair. Has Esure. She still have appendix.   The pain feels almost like surgical pain.        Review of Systems   Constitutional, HEENT, cardiovascular, pulmonary, gi and gu systems are negative, except as otherwise noted.      Objective           Vitals:  No vitals were obtained today due to virtual visit.    Physical Exam   healthy, alert and no distress  PSYCH: Alert and oriented times 3; coherent speech, normal   rate and volume, able to articulate logical thoughts, able   to abstract reason, no  tangential thoughts, no hallucinations   or delusions  Her affect is normal  RESP: No cough, no audible wheezing, able to talk in full sentences  Remainder of exam unable to be completed due to telephone visits        Phone call duration: 12 minutes

## 2021-08-27 ENCOUNTER — ANESTHESIA EVENT (OUTPATIENT)
Dept: SURGERY | Facility: CLINIC | Age: 51
DRG: 343 | End: 2021-08-27
Payer: COMMERCIAL

## 2021-08-27 ENCOUNTER — ANESTHESIA (OUTPATIENT)
Dept: SURGERY | Facility: CLINIC | Age: 51
DRG: 343 | End: 2021-08-27
Payer: COMMERCIAL

## 2021-08-27 VITALS
HEIGHT: 67 IN | TEMPERATURE: 97.6 F | BODY MASS INDEX: 22.6 KG/M2 | WEIGHT: 144 LBS | OXYGEN SATURATION: 96 % | HEART RATE: 48 BPM | RESPIRATION RATE: 16 BRPM | SYSTOLIC BLOOD PRESSURE: 99 MMHG | DIASTOLIC BLOOD PRESSURE: 54 MMHG

## 2021-08-27 PROCEDURE — 0DTJ4ZZ RESECTION OF APPENDIX, PERCUTANEOUS ENDOSCOPIC APPROACH: ICD-10-PCS | Performed by: SURGERY

## 2021-08-27 PROCEDURE — 250N000011 HC RX IP 250 OP 636: Performed by: SURGERY

## 2021-08-27 PROCEDURE — 250N000025 HC SEVOFLURANE, PER MIN: Performed by: SURGERY

## 2021-08-27 PROCEDURE — 250N000009 HC RX 250: Performed by: NURSE ANESTHETIST, CERTIFIED REGISTERED

## 2021-08-27 PROCEDURE — 250N000011 HC RX IP 250 OP 636: Performed by: STUDENT IN AN ORGANIZED HEALTH CARE EDUCATION/TRAINING PROGRAM

## 2021-08-27 PROCEDURE — 120N000002 HC R&B MED SURG/OB UMMC

## 2021-08-27 PROCEDURE — 272N000001 HC OR GENERAL SUPPLY STERILE: Performed by: SURGERY

## 2021-08-27 PROCEDURE — 258N000003 HC RX IP 258 OP 636: Performed by: NURSE ANESTHETIST, CERTIFIED REGISTERED

## 2021-08-27 PROCEDURE — 370N000017 HC ANESTHESIA TECHNICAL FEE, PER MIN: Performed by: SURGERY

## 2021-08-27 PROCEDURE — 250N000011 HC RX IP 250 OP 636: Performed by: NURSE ANESTHETIST, CERTIFIED REGISTERED

## 2021-08-27 PROCEDURE — 250N000011 HC RX IP 250 OP 636

## 2021-08-27 PROCEDURE — 250N000009 HC RX 250: Performed by: STUDENT IN AN ORGANIZED HEALTH CARE EDUCATION/TRAINING PROGRAM

## 2021-08-27 PROCEDURE — 258N000003 HC RX IP 258 OP 636

## 2021-08-27 PROCEDURE — 999N000141 HC STATISTIC PRE-PROCEDURE NURSING ASSESSMENT: Performed by: SURGERY

## 2021-08-27 PROCEDURE — 88304 TISSUE EXAM BY PATHOLOGIST: CPT | Mod: TC | Performed by: SURGERY

## 2021-08-27 PROCEDURE — 88304 TISSUE EXAM BY PATHOLOGIST: CPT | Mod: 26 | Performed by: PATHOLOGY

## 2021-08-27 PROCEDURE — 250N000013 HC RX MED GY IP 250 OP 250 PS 637

## 2021-08-27 PROCEDURE — 250N000013 HC RX MED GY IP 250 OP 250 PS 637: Performed by: STUDENT IN AN ORGANIZED HEALTH CARE EDUCATION/TRAINING PROGRAM

## 2021-08-27 PROCEDURE — 360N000076 HC SURGERY LEVEL 3, PER MIN: Performed by: SURGERY

## 2021-08-27 PROCEDURE — 44970 LAPAROSCOPY APPENDECTOMY: CPT | Mod: GC | Performed by: SURGERY

## 2021-08-27 PROCEDURE — 710N000010 HC RECOVERY PHASE 1, LEVEL 2, PER MIN: Performed by: SURGERY

## 2021-08-27 RX ORDER — PROPOFOL 10 MG/ML
INJECTION, EMULSION INTRAVENOUS PRN
Status: DISCONTINUED | OUTPATIENT
Start: 2021-08-27 | End: 2021-08-27

## 2021-08-27 RX ORDER — FENTANYL CITRATE 50 UG/ML
INJECTION, SOLUTION INTRAMUSCULAR; INTRAVENOUS PRN
Status: DISCONTINUED | OUTPATIENT
Start: 2021-08-27 | End: 2021-08-27

## 2021-08-27 RX ORDER — ACETAMINOPHEN 325 MG/1
650 TABLET ORAL EVERY 4 HOURS PRN
Qty: 30 TABLET | Refills: 0 | Status: SHIPPED | OUTPATIENT
Start: 2021-08-27 | End: 2021-09-10

## 2021-08-27 RX ORDER — FENTANYL CITRATE 50 UG/ML
50 INJECTION, SOLUTION INTRAMUSCULAR; INTRAVENOUS EVERY 5 MIN PRN
Status: DISCONTINUED | OUTPATIENT
Start: 2021-08-27 | End: 2021-08-27 | Stop reason: HOSPADM

## 2021-08-27 RX ORDER — PROPOFOL 10 MG/ML
INJECTION, EMULSION INTRAVENOUS CONTINUOUS PRN
Status: DISCONTINUED | OUTPATIENT
Start: 2021-08-27 | End: 2021-08-27

## 2021-08-27 RX ORDER — OXYCODONE HYDROCHLORIDE 5 MG/1
5-10 TABLET ORAL EVERY 4 HOURS PRN
Status: DISCONTINUED | OUTPATIENT
Start: 2021-08-27 | End: 2021-08-27 | Stop reason: HOSPADM

## 2021-08-27 RX ORDER — SCOLOPAMINE TRANSDERMAL SYSTEM 1 MG/1
1 PATCH, EXTENDED RELEASE TRANSDERMAL
Status: DISCONTINUED | OUTPATIENT
Start: 2021-08-27 | End: 2021-08-27 | Stop reason: HOSPADM

## 2021-08-27 RX ORDER — LABETALOL HYDROCHLORIDE 5 MG/ML
10 INJECTION, SOLUTION INTRAVENOUS
Status: DISCONTINUED | OUTPATIENT
Start: 2021-08-27 | End: 2021-08-27 | Stop reason: HOSPADM

## 2021-08-27 RX ORDER — LIDOCAINE HYDROCHLORIDE 20 MG/ML
INJECTION, SOLUTION INFILTRATION; PERINEURAL PRN
Status: DISCONTINUED | OUTPATIENT
Start: 2021-08-27 | End: 2021-08-27

## 2021-08-27 RX ORDER — DEXAMETHASONE SODIUM PHOSPHATE 4 MG/ML
INJECTION, SOLUTION INTRA-ARTICULAR; INTRALESIONAL; INTRAMUSCULAR; INTRAVENOUS; SOFT TISSUE PRN
Status: DISCONTINUED | OUTPATIENT
Start: 2021-08-27 | End: 2021-08-27

## 2021-08-27 RX ORDER — OXYCODONE HYDROCHLORIDE 5 MG/1
5 TABLET ORAL EVERY 4 HOURS PRN
Qty: 10 TABLET | Refills: 0 | Status: SHIPPED | OUTPATIENT
Start: 2021-08-27 | End: 2022-04-15

## 2021-08-27 RX ORDER — OXYCODONE HYDROCHLORIDE 5 MG/1
5 TABLET ORAL EVERY 4 HOURS PRN
Status: DISCONTINUED | OUTPATIENT
Start: 2021-08-27 | End: 2021-08-27

## 2021-08-27 RX ORDER — ONDANSETRON 2 MG/ML
INJECTION INTRAMUSCULAR; INTRAVENOUS PRN
Status: DISCONTINUED | OUTPATIENT
Start: 2021-08-27 | End: 2021-08-27

## 2021-08-27 RX ORDER — SODIUM CHLORIDE, SODIUM LACTATE, POTASSIUM CHLORIDE, CALCIUM CHLORIDE 600; 310; 30; 20 MG/100ML; MG/100ML; MG/100ML; MG/100ML
INJECTION, SOLUTION INTRAVENOUS CONTINUOUS PRN
Status: DISCONTINUED | OUTPATIENT
Start: 2021-08-27 | End: 2021-08-27

## 2021-08-27 RX ORDER — ACETAMINOPHEN 325 MG/1
650 TABLET ORAL EVERY 4 HOURS PRN
Status: DISCONTINUED | OUTPATIENT
Start: 2021-08-27 | End: 2021-08-27 | Stop reason: HOSPADM

## 2021-08-27 RX ORDER — BUPIVACAINE HYDROCHLORIDE 2.5 MG/ML
INJECTION, SOLUTION INFILTRATION; PERINEURAL PRN
Status: DISCONTINUED | OUTPATIENT
Start: 2021-08-27 | End: 2021-08-27 | Stop reason: HOSPADM

## 2021-08-27 RX ORDER — ONDANSETRON 2 MG/ML
4 INJECTION INTRAMUSCULAR; INTRAVENOUS EVERY 6 HOURS PRN
Status: DISCONTINUED | OUTPATIENT
Start: 2021-08-27 | End: 2021-08-27 | Stop reason: HOSPADM

## 2021-08-27 RX ORDER — SODIUM CHLORIDE, SODIUM LACTATE, POTASSIUM CHLORIDE, CALCIUM CHLORIDE 600; 310; 30; 20 MG/100ML; MG/100ML; MG/100ML; MG/100ML
INJECTION, SOLUTION INTRAVENOUS CONTINUOUS
Status: DISCONTINUED | OUTPATIENT
Start: 2021-08-27 | End: 2021-08-27 | Stop reason: HOSPADM

## 2021-08-27 RX ORDER — NALOXONE HYDROCHLORIDE 0.4 MG/ML
0.4 INJECTION, SOLUTION INTRAMUSCULAR; INTRAVENOUS; SUBCUTANEOUS
Status: DISCONTINUED | OUTPATIENT
Start: 2021-08-27 | End: 2021-08-27 | Stop reason: HOSPADM

## 2021-08-27 RX ORDER — NALOXONE HYDROCHLORIDE 0.4 MG/ML
0.4 INJECTION, SOLUTION INTRAMUSCULAR; INTRAVENOUS; SUBCUTANEOUS
Status: DISCONTINUED | OUTPATIENT
Start: 2021-08-27 | End: 2021-08-27

## 2021-08-27 RX ORDER — LIDOCAINE 40 MG/G
CREAM TOPICAL
Status: DISCONTINUED | OUTPATIENT
Start: 2021-08-27 | End: 2021-08-27 | Stop reason: HOSPADM

## 2021-08-27 RX ORDER — NALOXONE HYDROCHLORIDE 0.4 MG/ML
0.2 INJECTION, SOLUTION INTRAMUSCULAR; INTRAVENOUS; SUBCUTANEOUS
Status: DISCONTINUED | OUTPATIENT
Start: 2021-08-27 | End: 2021-08-27 | Stop reason: HOSPADM

## 2021-08-27 RX ORDER — PROCHLORPERAZINE 25 MG
25 SUPPOSITORY, RECTAL RECTAL EVERY 12 HOURS PRN
Status: DISCONTINUED | OUTPATIENT
Start: 2021-08-27 | End: 2021-08-27 | Stop reason: HOSPADM

## 2021-08-27 RX ORDER — HYDROMORPHONE HCL IN WATER/PF 6 MG/30 ML
0.2 PATIENT CONTROLLED ANALGESIA SYRINGE INTRAVENOUS
Status: DISCONTINUED | OUTPATIENT
Start: 2021-08-27 | End: 2021-08-27

## 2021-08-27 RX ORDER — ONDANSETRON 2 MG/ML
4 INJECTION INTRAMUSCULAR; INTRAVENOUS EVERY 30 MIN PRN
Status: DISCONTINUED | OUTPATIENT
Start: 2021-08-27 | End: 2021-08-27 | Stop reason: HOSPADM

## 2021-08-27 RX ORDER — HYDROMORPHONE HCL IN WATER/PF 6 MG/30 ML
0.2 PATIENT CONTROLLED ANALGESIA SYRINGE INTRAVENOUS EVERY 5 MIN PRN
Status: DISCONTINUED | OUTPATIENT
Start: 2021-08-27 | End: 2021-08-27 | Stop reason: HOSPADM

## 2021-08-27 RX ORDER — NALOXONE HYDROCHLORIDE 0.4 MG/ML
0.2 INJECTION, SOLUTION INTRAMUSCULAR; INTRAVENOUS; SUBCUTANEOUS
Status: DISCONTINUED | OUTPATIENT
Start: 2021-08-27 | End: 2021-08-27

## 2021-08-27 RX ORDER — ONDANSETRON 4 MG/1
4 TABLET, ORALLY DISINTEGRATING ORAL EVERY 6 HOURS PRN
Status: DISCONTINUED | OUTPATIENT
Start: 2021-08-27 | End: 2021-08-27 | Stop reason: HOSPADM

## 2021-08-27 RX ORDER — PROCHLORPERAZINE MALEATE 10 MG
10 TABLET ORAL EVERY 6 HOURS PRN
Status: DISCONTINUED | OUTPATIENT
Start: 2021-08-27 | End: 2021-08-27 | Stop reason: HOSPADM

## 2021-08-27 RX ORDER — ACETAMINOPHEN 325 MG/1
975 TABLET ORAL ONCE
Status: COMPLETED | OUTPATIENT
Start: 2021-08-27 | End: 2021-08-27

## 2021-08-27 RX ORDER — SODIUM CHLORIDE, SODIUM LACTATE, POTASSIUM CHLORIDE, CALCIUM CHLORIDE 600; 310; 30; 20 MG/100ML; MG/100ML; MG/100ML; MG/100ML
1000 INJECTION, SOLUTION INTRAVENOUS CONTINUOUS
Status: DISCONTINUED | OUTPATIENT
Start: 2021-08-27 | End: 2021-08-27 | Stop reason: HOSPADM

## 2021-08-27 RX ORDER — ONDANSETRON 4 MG/1
4 TABLET, ORALLY DISINTEGRATING ORAL EVERY 30 MIN PRN
Status: DISCONTINUED | OUTPATIENT
Start: 2021-08-27 | End: 2021-08-27 | Stop reason: HOSPADM

## 2021-08-27 RX ORDER — PIPERACILLIN SODIUM, TAZOBACTAM SODIUM 3; .375 G/15ML; G/15ML
3.38 INJECTION, POWDER, LYOPHILIZED, FOR SOLUTION INTRAVENOUS EVERY 6 HOURS
Status: DISCONTINUED | OUTPATIENT
Start: 2021-08-27 | End: 2021-08-27

## 2021-08-27 RX ADMIN — ACETAMINOPHEN 650 MG: 325 TABLET, FILM COATED ORAL at 10:44

## 2021-08-27 RX ADMIN — OXYCODONE HYDROCHLORIDE 10 MG: 5 TABLET ORAL at 10:44

## 2021-08-27 RX ADMIN — HYDROMORPHONE HYDROCHLORIDE 0.2 MG: 1 INJECTION, SOLUTION INTRAMUSCULAR; INTRAVENOUS; SUBCUTANEOUS at 04:52

## 2021-08-27 RX ADMIN — DEXAMETHASONE SODIUM PHOSPHATE 4 MG: 4 INJECTION, SOLUTION INTRA-ARTICULAR; INTRALESIONAL; INTRAMUSCULAR; INTRAVENOUS; SOFT TISSUE at 02:19

## 2021-08-27 RX ADMIN — SUGAMMADEX 150 MG: 100 INJECTION, SOLUTION INTRAVENOUS at 04:10

## 2021-08-27 RX ADMIN — SODIUM CHLORIDE, POTASSIUM CHLORIDE, SODIUM LACTATE AND CALCIUM CHLORIDE: 600; 310; 30; 20 INJECTION, SOLUTION INTRAVENOUS at 02:12

## 2021-08-27 RX ADMIN — ONDANSETRON 4 MG: 2 INJECTION INTRAMUSCULAR; INTRAVENOUS at 02:33

## 2021-08-27 RX ADMIN — SCOPALAMINE 1 PATCH: 1 PATCH, EXTENDED RELEASE TRANSDERMAL at 02:01

## 2021-08-27 RX ADMIN — PROPOFOL 50 MCG/KG/MIN: 10 INJECTION, EMULSION INTRAVENOUS at 02:20

## 2021-08-27 RX ADMIN — HYDROMORPHONE HYDROCHLORIDE 0.2 MG: 0.2 INJECTION, SOLUTION INTRAMUSCULAR; INTRAVENOUS; SUBCUTANEOUS at 08:01

## 2021-08-27 RX ADMIN — LIDOCAINE HYDROCHLORIDE 60 MG: 20 INJECTION, SOLUTION INFILTRATION; PERINEURAL at 02:19

## 2021-08-27 RX ADMIN — OXYCODONE HYDROCHLORIDE 5 MG: 5 TABLET ORAL at 06:58

## 2021-08-27 RX ADMIN — HYDROMORPHONE HYDROCHLORIDE 0.2 MG: 1 INJECTION, SOLUTION INTRAMUSCULAR; INTRAVENOUS; SUBCUTANEOUS at 04:57

## 2021-08-27 RX ADMIN — FENTANYL CITRATE 50 MCG: 50 INJECTION, SOLUTION INTRAMUSCULAR; INTRAVENOUS at 02:19

## 2021-08-27 RX ADMIN — PROPOFOL 50 MG: 10 INJECTION, EMULSION INTRAVENOUS at 04:14

## 2021-08-27 RX ADMIN — HYDROMORPHONE HYDROCHLORIDE 0.2 MG: 1 INJECTION, SOLUTION INTRAMUSCULAR; INTRAVENOUS; SUBCUTANEOUS at 05:04

## 2021-08-27 RX ADMIN — ROCURONIUM BROMIDE 50 MG: 10 INJECTION INTRAVENOUS at 02:19

## 2021-08-27 RX ADMIN — PROPOFOL 150 MG: 10 INJECTION, EMULSION INTRAVENOUS at 02:19

## 2021-08-27 RX ADMIN — PIPERACILLIN SODIUM AND TAZOBACTAM SODIUM 3.38 G: 3; .375 INJECTION, POWDER, LYOPHILIZED, FOR SOLUTION INTRAVENOUS at 06:36

## 2021-08-27 RX ADMIN — SODIUM CHLORIDE, POTASSIUM CHLORIDE, SODIUM LACTATE AND CALCIUM CHLORIDE 1000 ML: 600; 310; 30; 20 INJECTION, SOLUTION INTRAVENOUS at 04:45

## 2021-08-27 RX ADMIN — FENTANYL CITRATE 50 MCG: 50 INJECTION, SOLUTION INTRAMUSCULAR; INTRAVENOUS at 04:20

## 2021-08-27 RX ADMIN — ROCURONIUM BROMIDE 5 MG: 10 INJECTION INTRAVENOUS at 03:51

## 2021-08-27 RX ADMIN — ACETAMINOPHEN 975 MG: 325 TABLET, FILM COATED ORAL at 02:04

## 2021-08-27 RX ADMIN — FENTANYL CITRATE 50 MCG: 50 INJECTION, SOLUTION INTRAMUSCULAR; INTRAVENOUS at 02:57

## 2021-08-27 RX ADMIN — FENTANYL CITRATE 50 MCG: 50 INJECTION, SOLUTION INTRAMUSCULAR; INTRAVENOUS at 03:41

## 2021-08-27 ASSESSMENT — ACTIVITIES OF DAILY LIVING (ADL): ADLS_ACUITY_SCORE: 14

## 2021-08-27 NOTE — PLAN OF CARE
D/I:Up with [facility]. PO'ing a regular diet.MD OK with discharge even though she needed IV dilaudid this AM as he increased her oxycodone and told her to call from home if she needed more. Showed MD lower dressing on abdomen was saturated and MD told her to change dressing at home if it becomes more saturated. Sent her home with extra meplix x 3. Discharge summary discussed and she has no further questions at this time.  P:She will be wheeled down to pharmacy and front doors by her .

## 2021-08-27 NOTE — ANESTHESIA PROCEDURE NOTES
Airway       Patient location during procedure: OR  Staff -        CRNA: Paola Gu APRN CRNA       Performed By: CRNA  Consent for Airway        Urgency: elective  Indications and Patient Condition       Indications for airway management: hamilton-procedural         Mask difficulty assessment: 1 - vent by mask    Final Airway Details       Final airway type: endotracheal airway       Successful airway: ETT - single  Endotracheal Airway Details        ETT size (mm): 7.0       Cuffed: yes       Successful intubation technique: direct laryngoscopy       DL Blade Type: MAC 3       Grade View of Cords: 1       Adjucts: stylet       Position: Right       Measured from: gums/teeth       Secured at (cm): 23       Bite block used: None    Post intubation assessment        Placement verified by: capnometry and equal breath sounds        Number of attempts at approach: 1       Number of other approaches attempted: 0       Secured with: silk tape       Ease of procedure: easy       Dentition: Intact and Unchanged

## 2021-08-27 NOTE — ED PROVIDER NOTES
[place] EMERGENCY DEPARTMENT ([facility])  August 26, 2021  History     Chief Complaint   Patient presents with     Abdominal Pain     appy      The history is provided by the patient and medical records.     Jessie Candelario is a 50 year old female who presents to the Emergency Department for evaluation of right lower quadrant abdominal pain.  Patient states the pain started 2 days ago and has been consistently in the RLQ radiating to the right flank and across the back.  She indicates that the pain was throughout the entire abdomen 2 days ago, but has moved to the RLQ.  Patient reports the pain has mostly subsided since she got Dilaudid in clinic before arrival.  Patient denies fever and indicates she has been taking ibuprofen.  Patient denies nausea or vomiting.  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  She notes a past surgical history of a right hernia repair.  No other symptoms noted.    Per review of the chart, patient visited clinic in [place] for evaluation of abdominal pain today.  Patient was found to have elevated WBC and CRP.  Patient CT showed enlarged appendix suggesting acute appendicitis with suspected perforation.    CT ABDOMEN PELVIS W CONTRAST, 8/26/2021 2:55 PM  IMPRESSION:   Acute appendicitis with suspected perforation.    PAST MEDICAL HISTORY:   Past Medical History:   Diagnosis Date     ASCUS favoring benign 10/22/12    neg HR HPV (70)     Endometrial polyp 12/7/2016     Exercise-induced asthma 1/16/2014     H/O colposcopy with cervical biopsy 12/19/12    no bx taken.      IUD (intrauterine device) in place 10/24/2012     LSIL (low grade squamous intraepithelial lesion) on Pap smear 3/21/14    neg HPV     PONV (postoperative nausea and vomiting)      Submucous leiomyoma of uterus 12/7/2016       PAST SURGICAL HISTORY:   Past Surgical History:   Procedure Laterality Date     ARTHROSCOPY KNEE Left 3/3/2015    Procedure: ARTHROSCOPY KNEE;  Surgeon:  Marcos Shen MD;  Location:  OR      HAND/FINGER SURGERY UNLISTED       COLPOSCOPY CERVIX, BIOPSY CERVIX, ENDOCERVICAL CURETTAGE, COMBINED       DILATE CERVIX, ABLATE ENDOMETRIUM NOVASURE, COMBINED N/A 2/3/2017    Procedure: COMBINED DILATE CERVIX, ABLATE ENDOMETRIUM NOVASURE;  Surgeon: Marcos Sierra MD;  Location:  OR     HERNIA REPAIR  07     HYSTEROSCOPIC PLACEMENT CONTRACEPTIVE DEVICE  2014    Procedure: HYSTEROSCOPIC TUBAL LIGATION;  Hysteroscopic tubal ligation with Essure;  Surgeon: Marcos Sierra MD;  Location:  OR     OPERATIVE HYSTEROSCOPY WITH MORCELLATOR N/A 2/3/2017    Procedure: OPERATIVE HYSTEROSCOPY WITH MORCELLATOR (MYOSURE);  Surgeon: Marcos Sierra MD;  Location:  OR       Past medical history, past surgical history, medications, and allergies were reviewed with the patient. Additional pertinent items: None    FAMILY HISTORY:   Family History   Problem Relation Age of Onset     Heart Disease Mother      Hypertension Mother      Alcohol/Drug Brother         alcoholic     Cancer Maternal Grandmother         melanoma     Heart Disease Maternal Grandfather         arrythemia     Heart Disease Paternal Grandfather      Alcohol/Drug Brother         alcoholic       SOCIAL HISTORY:   Social History     Tobacco Use     Smoking status: Former Smoker     Packs/day: 0.50     Types: Cigarettes     Quit date: 1998     Years since quittin.6     Smokeless tobacco: Never Used   Substance Use Topics     Alcohol use: Yes     Comment: wine 6-7 glasses  per week     Social history was reviewed with the patient. Additional pertinent items: None      Patient's Medications   New Prescriptions    No medications on file   Previous Medications    ALBUTEROL (VENTOLIN HFA) 108 (90 BASE) MCG/ACT INHALER    INHALE 2 PUFFS INTO THE LUNGS EVERY 6 HOURS AS NEEDED FOR SHORTNESS OF BREATH / DYSPNEA    CO-ENZYME Q-10 100 MG CAPS CAPSULE    Take 50 mg by mouth daily    IBUPROFEN  "(ADVIL,MOTRIN) 200 MG TABLET    Take 800 mg by mouth four times a week.    OMEGA-3 FATTY ACIDS (FISH OIL) 1200 MG CAPSULE    Take 1,200 mg by mouth daily    UNABLE TO FIND    MEDICATION NAME: Vitalreds   Modified Medications    No medications on file   Discontinued Medications    No medications on file        No Known Allergies     Review of Systems  A complete review of systems was performed with pertinent positives and negatives noted in the HPI, and all other systems negative.    Physical Exam   BP: 133/79  Pulse: 67  Temp: 98.3  F (36.8  C)  Resp: 14  Height: 170.2 cm (5' 7\")  Weight: 65.3 kg (144 lb)  SpO2: 100 %      Physical Exam  Vitals and nursing note reviewed.   Constitutional:       General: She is not in acute distress.     Appearance: She is well-developed. She is not diaphoretic.   HENT:      Head: Normocephalic and atraumatic.      Mouth/Throat:      Pharynx: No oropharyngeal exudate.   Eyes:      General: No scleral icterus.        Right eye: No discharge.         Left eye: No discharge.      Pupils: Pupils are equal, round, and reactive to light.   Cardiovascular:      Rate and Rhythm: Normal rate and regular rhythm.      Heart sounds: Normal heart sounds. No murmur heard.   No friction rub. No gallop.    Pulmonary:      Effort: Pulmonary effort is normal. No respiratory distress.      Breath sounds: Normal breath sounds. No wheezing.   Chest:      Chest wall: No tenderness.   Abdominal:      General: Bowel sounds are normal. There is no distension.      Palpations: Abdomen is soft.      Tenderness: There is abdominal tenderness in the right lower quadrant.   Musculoskeletal:         General: No tenderness or deformity. Normal range of motion.      Cervical back: Normal range of motion and neck supple.   Skin:     General: Skin is warm and dry.      Coloration: Skin is not pale.      Findings: No erythema or rash.   Neurological:      Mental Status: She is alert and oriented to person, place, and " time.      Cranial Nerves: No cranial nerve deficit.         ED Course   7:22 PM  The patient was seen and examined by Kam Bonilla DO in Room ED10.       Procedures                       Results for orders placed or performed during the hospital encounter of 08/26/21 (from the past 24 hour(s))   [place] Draw    Narrative    The following orders were created for panel order [place] Draw.  Procedure                               Abnormality         Status                     ---------                               -----------         ------                     Extra Green Top (Lithium...[[id number]]                      In process                   Please view results for these tests on the individual orders.     Medications   0.9% sodium chloride BOLUS (1,000 mLs Intravenous New Bag 8/26/21 1926)     Followed by   sodium chloride 0.9% infusion (has no administration in time range)   HYDROmorphone (PF) (DILAUDID) injection 0.5 mg (has no administration in time range)   HYDROmorphone (PF) (DILAUDID) injection 0.5 mg (0.5 mg Intravenous Given 8/26/21 1859)             Assessments & Plan (with Medical Decision Making)   There is a 50-year-old female who presents due to appendicitis.  Patient has had 2 days of abdominal pain which is now concentrated in the right lower quadrant.  She had an outpatient CT done that shows appendicitis with concern for possible perforation.  Lab work in the emergency department shows a WBC count of 13.2.  Patient was given hydromorphone, IV fluids and piperacillin tazobactam.  I discussed the case with General Surgery who saw the patient.  They will admit to their service for likely surgery in the morning.    I have reviewed the nursing notes.    I have reviewed the findings, diagnosis, plan and need for follow up with the patient.    New Prescriptions    No medications on file       Final diagnoses:   None     ITodd, am serving as a trained medical scribe to document services  personally performed by Kam Bonilla DO, based on the provider's statements to me.     I, Kam Bonilla DO, was physically present and have reviewed and verified the accuracy of this note documented by Todd Candelaria.    Kam Bonilla DO    8/26/2021   [facility] EMERGENCY DEPARTMENT     Kam Bonilla DO  08/27/21 0058

## 2021-08-27 NOTE — OR NURSING
"0375 general surgery paged the following     Need a \"second sign\" on HAYDEN Candelario's pre op orders to be able to release the orders. thanks pre op rn *[id number]  "

## 2021-08-27 NOTE — H&P
"General Surgery H+P Note  [facility]    Jessie Candelario MRN# [id number]   Age: 50 year old YOB: 1970     Date of Admission:  8/26/2021            Assessment:     Jessie Candelario is a 51 yo F with PMHx exersize induced asthma who presents to the ED with 2 days of abdominal pain that started generalized and migrated to the RLQ. She is afebrile but with a leukocytosis of 13 and CT findings and abdominal exam suggestive of acute appendicitis with focal peritonitis.         Recommendations:     - Repeat abdominal exam overnight  - Continue IV abx  - NPO   - Add on for OR in AM - Laparoscopic appendectomy  - Admit to General Surgery overnight    Discussed with chief resident and staff.    Darby Edwards DO  General Surgery PGY2            History of Present Illness:   CC: Abdominal Pain     History is obtained from the patient    Jessie Candelario is a 51 yo F with PMHx asthma who presents to the ED with 2 days of abdominal pain that started generalized and migrated to the RLQ radiating to her back. Denies n/v, fever. Has been taking ibuprofen q6h x 2 days for the pain. Was seen at [facility] earlier today and came [facility] the [facility] ED when they suspected acute appendicitis. Last meal was 1PM. Last BM today, normal. No problems urinating but does note having a weak urge to go. We had a discussion with her family member present about abx alone vs surgical intervention and they elected to get it out \"as soon as possible\".     PSHx R inguinal hernia repair          Past Medical History:     Past Medical History:   Diagnosis Date     ASCUS favoring benign 10/22/12    neg HR HPV (70)     Endometrial polyp 12/7/2016     Exercise-induced asthma 1/16/2014     H/O colposcopy with cervical biopsy 12/19/12    no bx taken.      IUD (intrauterine device) in place 10/24/2012     LSIL (low grade squamous intraepithelial lesion) on Pap smear 3/21/14    neg HPV     PONV (postoperative nausea and " vomiting)      Submucous leiomyoma of uterus 2016             Past Surgical History:     Past Surgical History:   Procedure Laterality Date     ARTHROSCOPY KNEE Left 3/3/2015    Procedure: ARTHROSCOPY KNEE;  Surgeon: Marcos Shen MD;  Location: [facility]      HAND/FINGER SURGERY UNLISTED       COLPOSCOPY CERVIX, BIOPSY CERVIX, ENDOCERVICAL CURETTAGE, COMBINED       DILATE CERVIX, ABLATE ENDOMETRIUM NOVASURE, COMBINED N/A 2/3/2017    Procedure: COMBINED DILATE CERVIX, ABLATE ENDOMETRIUM NOVASURE;  Surgeon: Marcos Sierra MD;  Location: [facility] OR     HERNIA REPAIR  07     HYSTEROSCOPIC PLACEMENT CONTRACEPTIVE DEVICE  2014    Procedure: HYSTEROSCOPIC TUBAL LIGATION;  Hysteroscopic tubal ligation with Essure;  Surgeon: Marcos Sierra MD;  Location:  OR     OPERATIVE HYSTEROSCOPY WITH MORCELLATOR N/A 2/3/2017    Procedure: OPERATIVE HYSTEROSCOPY WITH MORCELLATOR (MYOSURE);  Surgeon: Marcos Sierra MD;  Location:  OR             Social History:     Social History     Socioeconomic History     Marital status:      Spouse name: Not on file     Number of children: Not on file     Years of education: Not on file     Highest education level: Not on file   Occupational History     Not on file   Tobacco Use     Smoking status: Former Smoker     Packs/day: 0.50     Types: Cigarettes     Quit date: 1998     Years since quittin.6     Smokeless tobacco: Never Used   Substance and Sexual Activity     Alcohol use: Yes     Comment: wine 6-7 glasses  per week     Drug use: No     Sexual activity: Yes     Partners: Male   Other Topics Concern     Parent/sibling w/ CABG, MI or angioplasty before 65F 55M? Not Asked   Social History Narrative     Not on file     Social Determinants of Health     Financial Resource Strain:      Difficulty of Paying Living Expenses:    Food Insecurity:      Worried About Running Out of Food in the Last Year:      Ran Out of Food in the Last Year:   "  Transportation Needs:      Lack of Transportation (Medical):      Lack of Transportation (Non-Medical):    Physical Activity:      Days of Exercise per Week:      Minutes of Exercise per Session:    Stress:      Feeling of Stress :    Social Connections:      Frequency of Communication with Friends and Family:      Frequency of Social Gatherings with Friends and Family:      Attends Orthodox Services:      Active Member of Clubs or Organizations:      Attends Club or Organization Meetings:      Marital Status:    Intimate Partner Violence:      Fear of Current or Ex-Partner:      Emotionally Abused:      Physically Abused:      Sexually Abused:              Family History:     Family History   Problem Relation Age of Onset     Heart Disease Mother      Hypertension Mother      Alcohol/Drug Brother         alcoholic     Cancer Maternal Grandmother         melanoma     Heart Disease Maternal Grandfather         arrythemia     Heart Disease Paternal Grandfather      Alcohol/Drug Brother         alcoholic             Allergies:    No Known Allergies          Medications:   0.9% sodium chloride BOLUS  [COMPLETED] iopamidol (ISOVUE-370) solution 93 mL  sodium chloride (PF) 0.9% PF flush 3 mL  [COMPLETED] sodium chloride 0.9 % bag 500mL for CT scan flush use    albuterol (VENTOLIN HFA) 108 (90 Base) MCG/ACT inhaler, INHALE 2 PUFFS INTO THE LUNGS EVERY 6 HOURS AS NEEDED FOR SHORTNESS OF BREATH / DYSPNEA  co-enzyme Q-10 100 MG CAPS capsule, Take 50 mg by mouth daily  ibuprofen (ADVIL,MOTRIN) 200 MG tablet, Take 800 mg by mouth four times a week.  Omega-3 Fatty Acids (FISH OIL) 1200 MG capsule, Take 1,200 mg by mouth daily  UNABLE TO FIND, MEDICATION NAME: Vitalreds              Review of Systems:      All other review of systems negative, except for what is mentioned above        Physical Exam:   /79   Pulse 67   Temp 98.3  F (36.8  C) (Oral)   Resp 14   Ht 1.702 m (5' 7\")   Wt 65.3 kg (144 lb)   SpO2 100%   " BMI 22.55 kg/m    General: Alert, interactive, NAD  Resp: NLB on RA, no cough or wheeze  Cardiac: RRR, no cyanosis, 2+ radial pulse  Abdomen: Soft, non-distended, tender to palpation in RLQ, positive Rosving's sign, no rigidity or guarding, no diffuse peritoneal signs  Extremities: No LE edema or obvious joint abnormalities, WWP  Skin: Warm and dry, no jaundice or rash  Neuro: A&Ox3, CN 2-12 intact, GRIFFITH            Data:     Lab Results   Component Value Date    WBC 13.2 (H) 08/26/2021    HGB 12.9 08/26/2021    HCT 38.1 08/26/2021     08/26/2021     08/26/2021    POTASSIUM 3.6 08/26/2021    CHLORIDE 106 08/26/2021    CO2 28 08/26/2021    BUN 10 08/26/2021    CR 0.82 08/26/2021    GLC 85 08/26/2021    AST 16 08/26/2021    ALT 18 08/26/2021    ALKPHOS 42 08/26/2021    BILITOTAL 0.5 08/26/2021       CT:   Appendix: The base of the appendix is dilated measuring up to 15 mm  (series 3 image 297). There is apparent mucosal discontinuity  suggesting perforation (series 3 image 300).     Mesentery/Peritoneum: Question of a trace amount of pneumoperitoneum  along the inferior hepatic margin (series 4 image 29). Also question  possible there are densities in the right lower quadrant (series 3  image 279).

## 2021-08-27 NOTE — DISCHARGE SUMMARY
General Surgery Discharge Summary    Jessie Candelario MRN# [id number]   YOB: 1970 Age: 50 year old     Date of Admission:  8/26/2021  Date of Discharge::  08/27/21   Admitting Physician:  Shonda Belle MD  Discharge Physician:  Shonda Belle MD  Primary Care Physician:        Kasia Dias          Admission Diagnoses:   Acute appendicitis with localized peritonitis, unspecified whether abscess present, unspecified whether gangrene present, unspecified whether perforation present [K35.30]            Discharge Diagnosis:   Same as admission diagnosis           Procedures:   Laparoscopic appendectomy by Dr. Belle        Non-operative procedures:   None performed          Consultations:   None           Medications Prior to Admission:     Facility-Administered Medications Prior to Admission   Medication Dose Route Frequency Provider Last Rate Last Admin     [DISCONTINUED] 0.9% sodium chloride BOLUS  1,000 mL Intravenous Once Xavier Cabezas PA-C         [DISCONTINUED] sodium chloride (PF) 0.9% PF flush 3 mL  3 mL Intracatheter Once Xavier Cabezas PA-C         Medications Prior to Admission   Medication Sig Dispense Refill Last Dose     albuterol (VENTOLIN HFA) 108 (90 Base) MCG/ACT inhaler INHALE 2 PUFFS INTO THE LUNGS EVERY 6 HOURS AS NEEDED FOR SHORTNESS OF BREATH / DYSPNEA 6.7 Inhaler 3 8/24/2021     Calcium Carb-Cholecalciferol (OYSTER SHELL CALCIUM) 500-400 MG-UNIT TABS Take 1 tablet by mouth daily   8/26/2021     co-enzyme Q-10 100 MG CAPS capsule Take 50 mg by mouth daily    8/26/2021 at Unknown time     ibuprofen (ADVIL,MOTRIN) 200 MG tablet Take 800 mg by mouth every 6 hours as needed    8/26/2021 at 0530     Multiple Vitamins-Iron TABS Take 1 tablet by mouth daily   8/26/2021 at am     Omega-3 Fatty Acids (FISH OIL) 1200 MG capsule Take 1,200 mg by mouth daily   8/26/2021 at am     tretinoin (RETIN-A) 0.025 % external cream APPLY TOPICALLY TO FACE AT BEDTIME,  "STARTING EVERY OTHER NIGHT, BUILD UP TO NIGHTLY AS TOLERATED   Past Week at Unknown time            Discharge Medications:      Jessie Candelario   Home Medication Instructions SHAMA:[id number]    Printed on:08/27/21 0465   Medication Information                      albuterol (VENTOLIN HFA) 108 (90 Base) MCG/ACT inhaler  INHALE 2 PUFFS INTO THE LUNGS EVERY 6 HOURS AS NEEDED FOR SHORTNESS OF BREATH / DYSPNEA             Calcium Carb-Cholecalciferol (OYSTER SHELL CALCIUM) 500-400 MG-UNIT TABS  Take 1 tablet by mouth daily             co-enzyme Q-10 100 MG CAPS capsule  Take 50 mg by mouth daily              ibuprofen (ADVIL,MOTRIN) 200 MG tablet  Take 800 mg by mouth every 6 hours as needed              Multiple Vitamins-Iron TABS  Take 1 tablet by mouth daily             Omega-3 Fatty Acids (FISH OIL) 1200 MG capsule  Take 1,200 mg by mouth daily             tretinoin (RETIN-A) 0.025 % external cream  APPLY TOPICALLY TO FACE AT BEDTIME, STARTING EVERY OTHER NIGHT, BUILD UP TO NIGHTLY AS TOLERATED                       Day of Discharge Exam   /73   Pulse 61   Temp 97.5  F (36.4  C) (Oral)   Resp 16   Ht 1.702 m (5' 7\")   Wt 65.3 kg (144 lb)   SpO2 99%   BMI 22.55 kg/m      General:  A&Ox3, NAD  Cardio:   RRR  Chest:   Non labored breathing on RA  Abd:   Soft, non-distended, appropriately TTP, incision c/d/i  Ext:   WWP          Brief History of Illness:   Jessie Candelario is a 51 yo F with PMHx asthma who presents to the ED with 2 days of abdominal pain that started generalized and migrated to the RLQ radiating to her back. Denies n/v, fever. Has been taking ibuprofen q6h x 2 days for the pain. Was seen at [facility] earlier today and came to the [facility] ED when they suspected acute appendicitis. Last meal was 1PM. Last BM today, normal. No problems urinating but does note having a weak urge to go. We had a discussion with her family member present about abx alone vs surgical intervention and " "they elected to get it out \"as soon as possible\".            Hospital Course:   The patient was admitted and underwent the above procedure. The patient tolerated the procedure well. There were no complications. Postoperatively the patient was transferred to the general floor for further care. The patient tolerated a regular diet. Pain was controlled with oral pain medication and the patient was able to ambulate and void without difficulty. The patient received appropriate education post operatively. On POD0 the patient was discharged to home with appropriate instructions and follow up. The patient acknowledged understanding and were in agreement with the plan.         Antibiotics Prescribed at Discharge:   None prescribed         Imaging Studies:     Results for orders placed or performed in visit on 08/26/21   CT Abdomen Pelvis w Contrast     Value    Radiologist flags Acute appendicitis, possible perforation (Urgent)    Narrative    EXAMINATION: CT ABDOMEN PELVIS W CONTRAST, 8/26/2021 2:55 PM    INDICATION: Diverticulitis suspected; RLQ abdominal pain, appendicitis  suspected (Age >= 14y); RLQ abdominal pain; Leukocytosis, unspecified  type    COMPARISON STUDY: CT 10/5/2016    TECHNIQUE: CT scan of the abdomen and pelvis was performed on  multidetector CT scanner using volumetric acquisition technique and  images were reconstructed in multiple planes with variable thickness  and reviewed on dedicated workstations.     CONTRAST: Isovue 370 93mL injected IV without oral contrast    CT scan radiation dose is optimized to minimum requisite dose using  automated dose modulation techniques.    FINDINGS:    ABDOMEN:  Lower thorax: Partially visualized right lower lobe lucency is similar  to prior. Stable 4 mm nodule in the lateral right lower lobe (series 3  image 10), statistically benign.    Liver: Scattered subcentimeter hypoattenuating foci are too small to  characterize and similar to prior.    Gallbladder and bile " ducts: The CT appearance is unremarkable.    Spleen: Unremarkable.    Pancreas: Normal CT appearance.     Adrenals: Unchanged size and appearance of the 1.5 cm left adrenal  nodule that was found to be an adrenal adenoma on prior adrenal CT.    Kidneys and ureters: Right renal cyst. Symmetric renal enhancement. No  solid renal lesions. No dense calculi or hydronephrosis.    Bladder: Unremarkable.    Bowel: Enhancing wall thickening of the cecum with fluid stranding  throughout the right lower quadrant. Multiple small lymph nodes in the  right lower quadrant are likely reactive.    Appendix: The base of the appendix is dilated measuring up to 15 mm  (series 3 image 297). There is apparent mucosal discontinuity  suggesting perforation (series 3 image 300).    Mesentery/Peritoneum: Question of a trace amount of pneumoperitoneum  along the inferior hepatic margin (series 4 image 29). Also question  possible there are densities in the right lower quadrant (series 3  image 279).    Nodes: Scattered small abdominal nodes, none meeting CT criteria for  pathologic enlargement.    Pelvis: Bilateral tubal occlusion devices. 3 x 2.3 cm right ovarian  cyst.    Bone windows: No aggressive osseous abnormalities. L3 limbus vertebra.  Multilevel degenerative endplate change, greatest at L5-S1.    Vasculature: Normal, without aneurysm or significant atherosclerotic  disease.    Soft tissues: Unremarkable.         Impression    IMPRESSION:   Acute appendicitis with suspected perforation.    [Urgent Result: Acute appendicitis, possible perforation]    Finding was identified on 8/26/2021 3:01 PM.     Xavier Cabezas was contacted by Dr. Thorpe at 8/26/2021 3:24 PM  and  verbalized understanding of the urgent finding.     I have personally reviewed the examination and initial interpretation  and I agree with the findings.    CORTNEY JIMENEZ DO         SYSTEM ID:  [id number]            Final Pathology Result:   Pending at time of  discharge         Discharge Instructions:     - No lifting greater than 10-15 pounds for 6 weeks after surgery.   - You may shower and get incisions wet starting 48 hrs after surgery but do not scrub incisions or submerge wounds (aka, bath, pool, hot tub, ect.) for 2 weeks. Remove outer dressing (if placed) after 48 hrs from surgery. If dermabond was used, avoid applying any lotions or ointments. If steri-strips were used, they will fall off on their own. You may leave open to air or cover with dry gauze if needed for comfort.  - No driving while taking narcotic pain medications.   - If you develop constipation, take stool softeners such as colace or miralax but stop if you develop diarrhea. Wean yourself off of narcotics.   - Call your primary provider to touch base with them regarding your recent admission.   - If you develop any fever/chills, worsening pain, redness, swelling, or drainage from your wound please call (Clinic [phone]).          Follow-Up:     - The surgery clinic will be calling periodically to check in after your surgery.   - Follow up with the general surgery clinic in 2 week(s) after discharge. You should be called to make an appointment within 3 business days. If you are not contacted, call [phone] to make an appointment        Home Health Care:     Not needed           Discharge Disposition:     Discharged to home      Condition at discharge: Stable      Henry Onofre MD  General Surgery PGY-1

## 2021-08-27 NOTE — PLAN OF CARE
Pt arrived from PACU at 0530. Admitted r/t acute appendicitis and subsequent removal.  Pain controlled, Vitally stable, on RA. BS+ LS clear, Heart sounds WNL.

## 2021-08-27 NOTE — ANESTHESIA POSTPROCEDURE EVALUATION
Patient: Jessie Candelario    Procedure(s):  APPENDECTOMY, LAPAROSCOPIC    Diagnosis:Acute appendicitis, unspecified acute appendicitis type [K35.80]  Diagnosis Additional Information: No value filed.    Anesthesia Type:  General    Note:  Disposition: Outpatient   Postop Pain Control: Uneventful            Sign Out: Well controlled pain   PONV: No   Neuro/Psych: Uneventful            Sign Out: Acceptable/Baseline neuro status   Airway/Respiratory: Uneventful            Sign Out: Acceptable/Baseline resp. status   CV/Hemodynamics: Uneventful            Sign Out: Acceptable CV status   Other NRE: NONE   DID A NON-ROUTINE EVENT OCCUR? No           Last vitals:  Vitals Value Taken Time   /59 08/27/21 0515   Temp 36.4  C (97.5  F) 08/27/21 0430   Pulse 65 08/27/21 0522   Resp 16 08/27/21 0515   SpO2 97 % 08/27/21 0516   Vitals shown include unvalidated device data.    Electronically Signed By: Chastity Bergeron MD  August 27, 2021  7:21 AM

## 2021-08-27 NOTE — PROGRESS NOTES
Patient seen in preop.  She has history and physical exam consistent with appendicitis.  CT scan further supports that diagnosis, with possible perforation.    On exam, she has tenderness in RLQ.  No signs of diffuse peritonitis.    CT scan reviewed.  Labs reviewed.      Reviewed risks/benefits of surgery.  Risks of bleeding, perforation, and damage to surrounding organs all discussed.  Potential for hernia formation discussed.  Wound problems and abscess formation discussed.  Risks of non-operative management discussed.    Weight restrictions after surgery discussed.  Questions answered.  She is understanding of discussion and agreeable to proceed.

## 2021-08-27 NOTE — OP NOTE
Procedure Date: 08/27/2021    PREOPERATIVE DIAGNOSIS:  Appendicitis, possibly perforated appendicitis..    POSTOPERATIVE DIAGNOSIS:  Gangrenous appendicitis.    PROCEDURE:  Laparoscopic appendectomy.    SURGEON:  Shonda Belle MD.    ESTIMATED BLOOD LOSS:  20 mL.    ANESTHESIA:  General endotracheal anesthesia.    INDICATIONS FOR PROCEDURE:  The patient is a healthy 50-year-old female with a 2-day history of abdominal pain consistent with appendicitis.  A CAT scan further supported this diagnosis.  Risks and benefits of the procedure were discussed with the patient including bleeding, infection, and damage to surrounding organs.  She was agreeable to proceed.    DESCRIPTION OF PROCEDURE:  The patient was placed in the supine position.  General endotracheal anesthesia was administered.  Area was prepped and draped in the usual sterile fashion.  Timeout was performed.  A 12 mm incision was made just inferior to the umbilicus and a Veress needle was then used to get into the abdomen in a safe manner on the first try.  Once pneumoperitoneum was obtained, I took the Veress needle out and used to perforating towel clamps to hold the skin up and placed a 5 mm port into the abdomen under direct vision with a 0 degree camera in the port.  This was successful on the first try.  I then placed another 5 mm camera in the left lower quadrant.  Then, with a camera in the abdomen, I upsized the umbilical port from a 5 to a 12 under direct vision.  Another 5 was placed in the suprapubic midline under direct vision.  I then placed the patient in Trendelenburg, leaning to the left and the appendix was examined and it was significantly inflamed and gangrenous in parts.  There were no signs of a clear-cut perforation and no pus was encountered during the procedure.  I used the [place] to go through the mesoappendix down to the base of the appendix.  The base of the appendix was viable and a blue load stapler was then fired  across the base of the appendix.  Appendix was put in a bag and then removed.  There had been some oozing from the umbilical port and that was stopped with cautery.  RLQ irrigated and fluid suctioned.  Other quadrants examined and any fluid visible suctioned.  Good hemostasis confirmed.  The fascia of the umbilicus was closed with figure-of-eight 0 Vicryl.  Ports were removed. Wounds were irrigated and then closed with 4-0 Vicryls.  Steri-Strips and Primapore dressings were placed after that.  The patient tolerated the procedure well and was taken to recovery in good condition.  Findings discussed with the patient's  at the end of the case.    Shonda Belle MD        D: 2021   T: 2021   MT: wali    Name:     FELY GEEYamel  MRN:      [id number]        Account:        [id number]   :      1970           Procedure Date: 2021     Document: [id number]

## 2021-08-27 NOTE — PROGRESS NOTES
Arrived from: PACU  Belongings/meds: Purse, clothes, jewelry, phone  2 RN Skin Assessment Completed by:  Lisa RN, & Divya VELASQUEZ  Non-intact findings documented (yes/no/NA): No

## 2021-08-27 NOTE — ED NOTES
[facility]   ED Nurse to Floor Handoff     Jessie Candelario is a 50 year old female who speaks English and lives with a spouse,  in a home  They arrived in the ED by car from emergency room    ED Chief Complaint: Abdominal Pain (appy )    ED Dx;   Final diagnoses:   Acute appendicitis with localized peritonitis, unspecified whether abscess present, unspecified whether gangrene present, unspecified whether perforation present         Needed?: No    Allergies: No Known Allergies.  Past Medical Hx:   Past Medical History:   Diagnosis Date     ASCUS favoring benign 10/22/12    neg HR HPV (70)     Endometrial polyp 12/7/2016     Exercise-induced asthma 1/16/2014     H/O colposcopy with cervical biopsy 12/19/12    no bx taken.      IUD (intrauterine device) in place 10/24/2012     LSIL (low grade squamous intraepithelial lesion) on Pap smear 3/21/14    neg HPV     PONV (postoperative nausea and vomiting)      Submucous leiomyoma of uterus 12/7/2016      Baseline Mental status: WDL  Current Mental Status changes: at basesline    Infection present or suspected this encounter: yes other possible appendix   Sepsis suspected: No  Isolation type: No active isolations  Patient tested for COVID 19 prior to admission: YES     Activity level - Baseline/Home:  Independent  Activity Level - Current:   Independent and Stand with Assist    Bariatric equipment needed?: No    In the ED these meds were given:   Medications   0.9% sodium chloride BOLUS (0 mLs Intravenous Stopped 8/26/21 2036)     Followed by   sodium chloride 0.9% infusion ( Intravenous New Bag 8/26/21 2043)   HYDROmorphone (PF) (DILAUDID) injection 0.5 mg (has no administration in time range)   HYDROmorphone (PF) (DILAUDID) injection 0.5 mg (0.5 mg Intravenous Given 8/26/21 1859)   piperacillin-tazobactam (ZOSYN) 3.375 g vial to attach to  mL bag (0 g Intravenous Stopped 8/26/21 2036)       Drips running?   Yes    Home pump  No    Current LDAs  Peripheral IV 08/26/21 Anterior;Left (Active)   Number of days: 0       Incision/Surgical Site 02/07/14 Vagina (Active)   Number of days: 2757       Incision/Surgical Site 03/03/15 Left Knee (Active)   Number of days: 2368       Incision/Surgical Site 02/03/17 Vagina (Active)   Number of days: 1665       Labs results:   Labs Ordered and Resulted from Time of ED Arrival Up to the Time of Departure from the ED   COMPREHENSIVE METABOLIC PANEL - Abnormal; Notable for the following components:       Result Value    Anion Gap 2 (*)     All other components within normal limits   CBC WITH PLATELETS AND DIFFERENTIAL - Abnormal; Notable for the following components:    WBC Count 13.2 (*)     Absolute Neutrophils 11.4 (*)     Absolute Immature Granulocytes 0.1 (*)     All other components within normal limits   EXTRA GREEN TOP (LITHIUM HEPARIN) ON ICE   CBC WITH PLATELETS & DIFFERENTIAL    Narrative:     The following orders were created for panel order CBC with platelets differential.  Procedure                               Abnormality         Status                     ---------                               -----------         ------                     CBC with platelets and d...[[id number]]  Abnormal            Final result                 Please view results for these tests on the individual orders.   ROUTINE UA WITH MICROSCOPIC REFLEX TO CULTURE   COVID-19 VIRUS (CORONAVIRUS) BY PCR   EXTRA TUBE    Narrative:     The following orders were created for panel order [place] Draw.  Procedure                               Abnormality         Status                     ---------                               -----------         ------                     Extra Green Top (Lithium...[[id number]]                      Final result                 Please view results for these tests on the individual orders.       Imaging Studies:   Recent Results (from the past 24 hour(s))   CT Abdomen Pelvis  w Contrast   Result Value    Radiologist flags Acute appendicitis, possible perforation (Urgent)    Narrative    EXAMINATION: CT ABDOMEN PELVIS W CONTRAST, 8/26/2021 2:55 PM    INDICATION: Diverticulitis suspected; RLQ abdominal pain, appendicitis  suspected (Age >= 14y); RLQ abdominal pain; Leukocytosis, unspecified  type    COMPARISON STUDY: CT 10/5/2016    TECHNIQUE: CT scan of the abdomen and pelvis was performed on  multidetector CT scanner using volumetric acquisition technique and  images were reconstructed in multiple planes with variable thickness  and reviewed on dedicated workstations.     CONTRAST: Isovue 370 93mL injected IV without oral contrast    CT scan radiation dose is optimized to minimum requisite dose using  automated dose modulation techniques.    FINDINGS:    ABDOMEN:  Lower thorax: Partially visualized right lower lobe lucency is similar  to prior. Stable 4 mm nodule in the lateral right lower lobe (series 3  image 10), statistically benign.    Liver: Scattered subcentimeter hypoattenuating foci are too small to  characterize and similar to prior.    Gallbladder and bile ducts: The CT appearance is unremarkable.    Spleen: Unremarkable.    Pancreas: Normal CT appearance.     Adrenals: Unchanged size and appearance of the 1.5 cm left adrenal  nodule that was found to be an adrenal adenoma on prior adrenal CT.    Kidneys and ureters: Right renal cyst. Symmetric renal enhancement. No  solid renal lesions. No dense calculi or hydronephrosis.    Bladder: Unremarkable.    Bowel: Enhancing wall thickening of the cecum with fluid stranding  throughout the right lower quadrant. Multiple small lymph nodes in the  right lower quadrant are likely reactive.    Appendix: The base of the appendix is dilated measuring up to 15 mm  (series 3 image 297). There is apparent mucosal discontinuity  suggesting perforation (series 3 image 300).    Mesentery/Peritoneum: Question of a trace amount of  "pneumoperitoneum  along the inferior hepatic margin (series 4 image 29). Also question  possible there are densities in the right lower quadrant (series 3  image 279).    Nodes: Scattered small abdominal nodes, none meeting CT criteria for  pathologic enlargement.    Pelvis: Bilateral tubal occlusion devices. 3 x 2.3 cm right ovarian  cyst.    Bone windows: No aggressive osseous abnormalities. L3 limbus vertebra.  Multilevel degenerative endplate change, greatest at L5-S1.    Vasculature: Normal, without aneurysm or significant atherosclerotic  disease.    Soft tissues: Unremarkable.         Impression    IMPRESSION:   Acute appendicitis with suspected perforation.    [Urgent Result: Acute appendicitis, possible perforation]    Finding was identified on 8/26/2021 3:01 PM.     Xavier Cabezas was contacted by Dr. Thorpe at 8/26/2021 3:24 PM  and  verbalized understanding of the urgent finding.     I have personally reviewed the examination and initial interpretation  and I agree with the findings.    CORTNEY JIMENEZ DO         SYSTEM ID:  [id number]       Recent vital signs:   /60   Pulse 69   Temp 98.3  F (36.8  C) (Oral)   Resp 14   Ht 1.702 m (5' 7\")   Wt 65.3 kg (144 lb)   SpO2 100%   BMI 22.55 kg/m      Ayo Coma Scale Score: 15 (08/26/21 1832)       Cardiac Rhythm: Normal Sinus  Pt needs tele? No  Skin/wound Issues: None    Code Status: Full Code    Pain control: fair    Nausea control: good    Abnormal labs/tests/findings requiring intervention: see epic    Family present during ED course? Yes   Family Comments/Social Situation comments:  at bedside    Tasks needing completion: None    Fany Watkins, RN    [phone] [facility]      "

## 2021-08-27 NOTE — BRIEF OP NOTE
[facility]    Brief Operative Note    Pre-operative diagnosis: Acute appendicitis, unspecified acute appendicitis type [K35.80]  Post-operative diagnosis Same as pre-operative diagnosis    Procedure: Procedure(s):  APPENDECTOMY, LAPAROSCOPIC  Surgeon: Surgeon(s) and Role:     * Shonda Belle MD - Primary     * Darby Edwards MD - Resident - Assisting    Octavia Mario - Resident - Assisting   Anesthesia: General   Estimated blood loss: Less than 10 ml  Drains: None  Specimens:   ID Type Source Tests Collected by Time Destination   1 : Appendix  Tissue Appendix SURGICAL PATHOLOGY EXAM Shonda Belle MD 8/27/2021  3:38 AM      Findings: Gangrenous appendix.  Complications: None.  Implants: * No implants in log *    Darby Edwards DO  General Surgery PGY2

## 2021-08-27 NOTE — ANESTHESIA PREPROCEDURE EVALUATION
Anesthesia Pre-Procedure Evaluation    Patient: Jessie Candelario   MRN: [id number] : 1970        Preoperative Diagnosis: Acute appendicitis, unspecified acute appendicitis type [K35.80]   Procedure : Procedure(s):  APPENDECTOMY, LAPAROSCOPIC     Past Medical History:   Diagnosis Date     ASCUS favoring benign 10/22/12    neg HR HPV (70)     Endometrial polyp 2016     Exercise-induced asthma 2014     H/O colposcopy with cervical biopsy 12    no bx taken.      IUD (intrauterine device) in place 10/24/2012     LSIL (low grade squamous intraepithelial lesion) on Pap smear 3/21/14    neg HPV     PONV (postoperative nausea and vomiting)      Submucous leiomyoma of uterus 2016      Past Surgical History:   Procedure Laterality Date     ARTHROSCOPY KNEE Left 3/3/2015    Procedure: ARTHROSCOPY KNEE;  Surgeon: Marcos Shen MD;  Location:  OR     C HAND/FINGER SURGERY UNLISTED       COLPOSCOPY CERVIX, BIOPSY CERVIX, ENDOCERVICAL CURETTAGE, COMBINED       DILATE CERVIX, ABLATE ENDOMETRIUM NOVASURE, COMBINED N/A 2/3/2017    Procedure: COMBINED DILATE CERVIX, ABLATE ENDOMETRIUM NOVASURE;  Surgeon: Marcos Sierra MD;  Location:  OR     HERNIA REPAIR  07     HYSTEROSCOPIC PLACEMENT CONTRACEPTIVE DEVICE  2014    Procedure: HYSTEROSCOPIC TUBAL LIGATION;  Hysteroscopic tubal ligation with Essure;  Surgeon: Marcos Sierra MD;  Location:  OR     OPERATIVE HYSTEROSCOPY WITH MORCELLATOR N/A 2/3/2017    Procedure: OPERATIVE HYSTEROSCOPY WITH MORCELLATOR (MYOSURE);  Surgeon: Marcos Sierra MD;  Location:  OR      No Known Allergies   Social History     Tobacco Use     Smoking status: Former Smoker     Packs/day: 0.50     Types: Cigarettes     Quit date: 1998     Years since quittin.6     Smokeless tobacco: Never Used   Substance Use Topics     Alcohol use: Yes     Comment: wine 6-7 glasses  per week      Wt Readings from Last 1 Encounters:   21 65.3  kg (144 lb)        Anesthesia Evaluation   Pt has had prior anesthetic.     History of anesthetic complications  - PONV.      ROS/MED HX  ENT/Pulmonary:     (+) asthma     Neurologic:  - neg neurologic ROS     Cardiovascular:  - neg cardiovascular ROS     METS/Exercise Tolerance:     Hematologic:  - neg hematologic  ROS     Musculoskeletal:  - neg musculoskeletal ROS     GI/Hepatic:     (+) appendicitis,     Renal/Genitourinary:  - neg Renal ROS     Endo:  - neg endo ROS     Psychiatric/Substance Use:  - neg psychiatric ROS     Infectious Disease: Comment: covid negative   - neg infectious disease ROS     Malignancy:  - neg malignancy ROS     Other:  - neg other ROS             OUTSIDE LABS:  CBC:   Lab Results   Component Value Date    WBC 13.2 (H) 08/26/2021    WBC 12.2 (H) 08/26/2021    HGB 12.9 08/26/2021    HGB 13.6 08/26/2021    HCT 38.1 08/26/2021    HCT 39.4 08/26/2021     08/26/2021     08/26/2021     BMP:   Lab Results   Component Value Date     08/26/2021     01/28/2016    POTASSIUM 3.6 08/26/2021    POTASSIUM 3.9 01/28/2016    CHLORIDE 106 08/26/2021    CHLORIDE 105 01/28/2016    CO2 28 08/26/2021    CO2 30 01/28/2016    BUN 10 08/26/2021    BUN 22 01/28/2016    CR 0.82 08/26/2021    CR 0.81 01/28/2016    GLC 85 08/26/2021    GLC 91 11/03/2020     COAGS: No results found for: PTT, INR, FIBR  POC:   Lab Results   Component Value Date    HCG negative 02/07/2014     HEPATIC:   Lab Results   Component Value Date    ALBUMIN 3.4 08/26/2021    PROTTOTAL 7.3 08/26/2021    ALT 18 08/26/2021    AST 16 08/26/2021    ALKPHOS 42 08/26/2021    BILITOTAL 0.5 08/26/2021     OTHER:   Lab Results   Component Value Date    DANIEL 8.9 08/26/2021    TSH 0.75 01/28/2016    .0 (H) 08/26/2021       Anesthesia Plan    ASA Status:  1      Anesthesia Type: General.     - Airway: ETT   Induction: RSI.   Maintenance: Balanced.        Consents            Postoperative Care       PONV prophylaxis:  Ondansetron (or other 5HT-3), Dexamethasone or Solumedrol     Comments:                Johnny Bonilla, DO

## 2021-08-27 NOTE — ANESTHESIA CARE TRANSFER NOTE
Patient: Jessie Candelario    Procedure(s):  APPENDECTOMY, LAPAROSCOPIC    Diagnosis: Acute appendicitis, unspecified acute appendicitis type [K35.80]  Diagnosis Additional Information: No value filed.    Anesthesia Type:   General     Note:    Oropharynx: spontaneously breathing  Level of Consciousness: awake and drowsy  Oxygen Supplementation: nasal cannula  Level of Supplemental Oxygen (L/min / FiO2): 2  Independent Airway: airway patency satisfactory and stable  Dentition: dentition unchanged  Vital Signs Stable: post-procedure vital signs reviewed and stable  Report to RN Given: handoff report given  Patient transferred to: PACU    Handoff Report: Identifed the Patient, Identified the Reponsible Provider, Reviewed the pertinent medical history, Discussed the surgical course, Reviewed Intra-OP anesthesia mangement and issues during anesthesia, Set expectations for post-procedure period and Allowed opportunity for questions and acknowledgement of understanding      Vitals:  Vitals Value Taken Time   /73    Temp 97.5    Pulse 62    Resp 12    SpO2 99%        Electronically Signed By: Johnny Bonilla DO  August 27, 2021  4:34 AM

## 2021-08-28 NOTE — RESULT ENCOUNTER NOTE
Results discussed directly with patient while patient was present. Any further details documented in the note.   Kasia Dias MD PhD

## 2021-08-30 ENCOUNTER — PATIENT OUTREACH (OUTPATIENT)
Dept: SURGERY | Facility: CLINIC | Age: 51
End: 2021-08-30
Payer: COMMERCIAL

## 2021-08-30 ENCOUNTER — PATIENT OUTREACH (OUTPATIENT)
Dept: SURGERY | Facility: CLINIC | Age: 51
End: 2021-08-30

## 2021-08-30 DIAGNOSIS — Z53.9 ERRONEOUS ENCOUNTER--DISREGARD: Primary | ICD-10-CM

## 2021-08-30 LAB
PATH REPORT.COMMENTS IMP SPEC: NORMAL
PATH REPORT.COMMENTS IMP SPEC: NORMAL
PATH REPORT.FINAL DX SPEC: NORMAL
PATH REPORT.GROSS SPEC: NORMAL
PATH REPORT.MICROSCOPIC SPEC OTHER STN: NORMAL
PATH REPORT.RELEVANT HX SPEC: NORMAL
PHOTO IMAGE: NORMAL

## 2021-08-30 NOTE — PROGRESS NOTES
Jessie Candelario is a patient of Dr. Shonda Belle that underwent laparoscopic appendectomy approximately 3 days ago (8/27).  Attempted to contact patient via telephone for a status update and review post op teaching.  LM on VM to call office.  Await return call.      Of note:  Pathology:  Pending  Wound:  Steri-strips  Follow-up:  Routine  Restrictions:  - No strenuous exercise for 3-4 weeks  - No lifting, pushing, pulling more than 15-20 pounds for 3-4 weeks  New medications:  Tylenol, Oxycodone  Equipment/Supplies:  None      RN Post-Op/Post-Discharge Care Coordination Note    Spoke with Patient.    Support  Patient able to care for self independently     Health Status  Nausea/Vomiting: Patient denies nausea/vomiting.  Eating/drinking: Patient is able to eat and drink without any complaints.  Reports decreased appetite, recommended small meals, high in protein.  Bowel habits: Patient reports having a normal bowel movement. Needed to take a laxative.  Drains (WILLIAM): N/A  Fevers/chills: Patient denies any fever or chills.  Incisions: Patient denies any signs and symptoms of infection..  Wound closure:  Steri-strips  Pain: Improved.  Transitioned to OTC analgesics.    Activity/Restrictions  Other No lifting in excess of 15-20 pounds for 6  weeks per provider    Equipment  None    Pathology reviewed with patient:  No, not yet available    Forms/Letters  Yes- 6 weeks, no lifting 15-20 pounds. RTW 10/9.    All of her questions were answered including reviewing restrictions and wound care.  She will call this office if she has any further questions and/or concerns.      In lieu of a post-op clinic patient that patient would like to be contacted in approximately 7-10 days via telephone.    A copy of this note was routed to the primary surgeon.      Whom and When to Call  Patient acknowledges understanding of how to manage any medication changes and   when to seek medical care.     Patient advised that if after hour  medical concerns arise to please call [phone] and choose option 4 to speak to the physician on call.

## 2021-09-01 ENCOUNTER — TELEPHONE (OUTPATIENT)
Dept: FAMILY MEDICINE | Facility: CLINIC | Age: 51
End: 2021-09-01

## 2021-09-05 ENCOUNTER — HEALTH MAINTENANCE LETTER (OUTPATIENT)
Age: 51
End: 2021-09-05

## 2021-09-07 ENCOUNTER — PATIENT OUTREACH (OUTPATIENT)
Dept: SURGERY | Facility: CLINIC | Age: 51
End: 2021-09-07

## 2021-09-07 NOTE — PROGRESS NOTES
Jessie Candelario was contacted several days post procedure via telephone for a status update and elected to have a telephone follow -up call approximately 7-10 days after original contact in lieu of a clinic visit with Dr. Shonda Belle.  She continues to do well and the steri-strips are starting to peel off.  She can remove the remaining over the weekend, if she desires. The patients wounds are healed and the area is C/D/I.  She is afebrile, pain free, and yaima po; the patient is slowly resuming her normal activity.   Discussed restrictions including no lifting in excess of 1520 pounds for 6 weeks.    Pathology was reviewed with the patient: Yes    All of Jessie Guevarapaul question's were answered and  she would like to return to the clinic on a PRN basis.  The patient is aware that  she may schedule a post op appointment at any time.    A copy of this note was routed to the patients surgeon.      Pathology:  Case Report   Surgical Pathology Report                         Case: [id number]                                   Authorizing Provider:  Shonda Belle         Collected:           08/27/2021 03:38 AM                                  MD Whitney                                                                  Ordering Location:      MAIN OR                 Received:            08/27/2021 08:26 AM           Pathologist:           Angela Ruelas MD                                                    Specimen:    Appendix, Appendix                                                                         Final Diagnosis   A(1). Appendix, appendectomy:  - Acute appendicitis with serositis

## 2021-09-07 NOTE — TELEPHONE ENCOUNTER
This paperwork needs to come from her surgeon who performed the appendectomy. Duration for disability is determined by surgeon. Ask pt to get surgeon's fax number. We'll fax the form to them.

## 2021-09-08 NOTE — TELEPHONE ENCOUNTER
Bri Clifford RN McAree, Lori; Kasia Dias MD PhD  Good afternoon,     Jessie is recovering well after her recent appendectomy.  Our team has taken care of all her forms.  You can destroy what you have.  Jessie is aware of this.     Bri Arce RN, BSN, CNOR, RNFA, CBCN   [facility]

## 2021-12-26 ENCOUNTER — HEALTH MAINTENANCE LETTER (OUTPATIENT)
Age: 51
End: 2021-12-26

## 2022-04-15 ENCOUNTER — OFFICE VISIT (OUTPATIENT)
Dept: FAMILY MEDICINE | Facility: CLINIC | Age: 52
End: 2022-04-15
Payer: COMMERCIAL

## 2022-04-15 VITALS
TEMPERATURE: 97.7 F | SYSTOLIC BLOOD PRESSURE: 128 MMHG | BODY MASS INDEX: 23.63 KG/M2 | WEIGHT: 147 LBS | DIASTOLIC BLOOD PRESSURE: 81 MMHG | HEIGHT: 66 IN | RESPIRATION RATE: 12 BRPM | HEART RATE: 56 BPM | OXYGEN SATURATION: 98 %

## 2022-04-15 DIAGNOSIS — N95.2 ATROPHIC VAGINITIS: ICD-10-CM

## 2022-04-15 DIAGNOSIS — Z11.59 NEED FOR HEPATITIS C SCREENING TEST: ICD-10-CM

## 2022-04-15 DIAGNOSIS — Z00.00 ROUTINE GENERAL MEDICAL EXAMINATION AT A HEALTH CARE FACILITY: Primary | ICD-10-CM

## 2022-04-15 DIAGNOSIS — J45.990 EXERCISE-INDUCED ASTHMA: ICD-10-CM

## 2022-04-15 DIAGNOSIS — Z12.11 COLON CANCER SCREENING: ICD-10-CM

## 2022-04-15 PROCEDURE — 90471 IMMUNIZATION ADMIN: CPT | Performed by: INTERNAL MEDICINE

## 2022-04-15 PROCEDURE — 99396 PREV VISIT EST AGE 40-64: CPT | Mod: 25 | Performed by: INTERNAL MEDICINE

## 2022-04-15 PROCEDURE — 90715 TDAP VACCINE 7 YRS/> IM: CPT | Performed by: INTERNAL MEDICINE

## 2022-04-15 RX ORDER — ALBUTEROL SULFATE 90 UG/1
1-2 AEROSOL, METERED RESPIRATORY (INHALATION) EVERY 6 HOURS PRN
Qty: 18 G | Refills: 1 | Status: SHIPPED | OUTPATIENT
Start: 2022-04-15 | End: 2022-10-25

## 2022-04-15 ASSESSMENT — ENCOUNTER SYMPTOMS
BREAST MASS: 0
DIZZINESS: 0
ABDOMINAL PAIN: 0
EYE PAIN: 0
HEARTBURN: 0
HEMATURIA: 0
FREQUENCY: 0
PALPITATIONS: 0
CHILLS: 0
DIARRHEA: 0
WEAKNESS: 0
NERVOUS/ANXIOUS: 0
JOINT SWELLING: 0
SHORTNESS OF BREATH: 0
DYSURIA: 0
SORE THROAT: 0
NAUSEA: 0
HEADACHES: 0
CONSTIPATION: 0
COUGH: 0
HEMATOCHEZIA: 0
FEVER: 0
MYALGIAS: 1
PARESTHESIAS: 0
ARTHRALGIAS: 1

## 2022-04-15 ASSESSMENT — ASTHMA QUESTIONNAIRES
QUESTION_4 LAST FOUR WEEKS HOW OFTEN HAVE YOU USED YOUR RESCUE INHALER OR NEBULIZER MEDICATION (SUCH AS ALBUTEROL): NOT AT ALL
QUESTION_5 LAST FOUR WEEKS HOW WOULD YOU RATE YOUR ASTHMA CONTROL: COMPLETELY CONTROLLED
QUESTION_3 LAST FOUR WEEKS HOW OFTEN DID YOUR ASTHMA SYMPTOMS (WHEEZING, COUGHING, SHORTNESS OF BREATH, CHEST TIGHTNESS OR PAIN) WAKE YOU UP AT NIGHT OR EARLIER THAN USUAL IN THE MORNING: NOT AT ALL
QUESTION_1 LAST FOUR WEEKS HOW MUCH OF THE TIME DID YOUR ASTHMA KEEP YOU FROM GETTING AS MUCH DONE AT WORK, SCHOOL OR AT HOME: NONE OF THE TIME
ACT_TOTALSCORE: 25
QUESTION_2 LAST FOUR WEEKS HOW OFTEN HAVE YOU HAD SHORTNESS OF BREATH: NOT AT ALL
ACT_TOTALSCORE: 25

## 2022-04-15 ASSESSMENT — PAIN SCALES - GENERAL: PAINLEVEL: NO PAIN (0)

## 2022-04-15 NOTE — NURSING NOTE
Screening Questionnaire for Adult Immunization     Are you sick today?   No    Do you have allergies to medications, food or any vaccine?   No    Have you ever had a serious reaction after receiving a vaccination?   No    Do you have a long-term health problem with heart disease, lung disease,  asthma, kidney disease, diabetes, anemia, metabolic or blood disease?   No    Do you have cancer, leukemia, AIDS, or any immune system problem?   No    Do you take cortisone, prednisone, other steroids, or anticancer drugs, or  have you had any x-ray (radiation) treatments?   No    Have you had a seizure, brain, or other nervous system problem?   No    During the past year, have you received a transfusion of blood or blood       products, or been given a medicine called immune (gamma) globulin?   No    For women: Are you pregnant or is there a chance you could become         pregnant during the next month?   No    Have you received any vaccinations in the past 4 weeks?   No     Immunization questionnaire answers were all negative.     Screening performed by Yina Barreto CMA on 4/15/2022 at 11:23 AM.    Prior to injection verified patient identity using patient's name and date of birth. Patient instructed to remain in clinic for 15 minutes afterwards, and to report any adverse reaction to staff mmediately.          
1.8

## 2022-04-15 NOTE — PROGRESS NOTES
SUBJECTIVE:   CC: Jessie Candelario is an 51 year old woman who presents for preventive health visit.       Patient has been advised of split billing requirements and indicates understanding: Yes  Feeling hot at night time periodically.   History endometrial ablation, no menses for years.     Healthy Habits:     Getting at least 3 servings of Calcium per day:  Yes    Bi-annual eye exam:  Yes    Dental care twice a year:  Yes    Sleep apnea or symptoms of sleep apnea:  None    Diet:  Regular (no restrictions)    Frequency of exercise:  2-3 days/week    Duration of exercise:  30-45 minutes    Taking medications regularly:  Yes    Medication side effects:  Not applicable    PHQ-2 Total Score: 0    Additional concerns today:  Yes    Wants to talk about menopause  Has been having hot flashes at night  She had colonoscopy done in her mid 40 years. It was normal but was told to get rescreened at age 50.       Today's PHQ-2 Score:   PHQ-2 (  [employer]) 4/15/2022   Q1: Little interest or pleasure in doing things 0   Q2: Feeling down, depressed or hopeless 0   PHQ-2 Score 0   PHQ-2 Total Score (12-17 Years)- Positive if 3 or more points; Administer PHQ-A if positive -   Q1: Little interest or pleasure in doing things Not at all   Q2: Feeling down, depressed or hopeless Not at all   PHQ-2 Score 0       Abuse: Current or Past (Physical, Sexual or Emotional) - No  Do you feel safe in your environment? Yes    Have you ever done Advance Care Planning? (For example, a Health Directive, POLST, or a discussion with a medical provider or your loved ones about your wishes): Yes, patient states has an Advance Care Planning document and will bring a copy to the clinic.    Social History     Tobacco Use     Smoking status: Former Smoker     Packs/day: 0.50     Types: Cigarettes     Quit date: 1998     Years since quittin.2     Smokeless tobacco: Never Used   Substance Use Topics     Alcohol use: Yes     Comment: wine 6-7  glasses  per week         Alcohol Use 4/15/2022   Prescreen: >3 drinks/day or >7 drinks/week? No   Prescreen: >3 drinks/day or >7 drinks/week? -       Reviewed orders with patient.  Reviewed health maintenance and updated orders accordingly - Yes      Breast Cancer Screening:    Breast CA Risk Assessment (FHS-7) 4/15/2022   Do you have a family history of breast, colon, or ovarian cancer? No / Unknown         Mammogram Screening: Recommended annual mammography  Pertinent mammograms are reviewed under the imaging tab.    History of abnormal Pap smear: NO - age 30-65 PAP every 5 years with negative HPV co-testing recommended  PAP / HPV Latest Ref Rng & Units 10/22/2020 5/20/2015 3/21/2014   PAP (Historical) - NIL NIL LSIL(A)   HPV16 NEG:Negative Negative Negative -   HPV18 NEG:Negative Negative Negative -   HRHPV NEG:Negative Negative Negative -     Reviewed and updated as needed this visit by clinical staff   Tobacco  Allergies  Meds                Reviewed and updated as needed this visit by Provider                     Review of Systems   Constitutional: Negative for chills and fever.   HENT: Negative for congestion, ear pain, hearing loss and sore throat.    Eyes: Negative for pain and visual disturbance.   Respiratory: Negative for cough and shortness of breath.    Cardiovascular: Negative for chest pain, palpitations and peripheral edema.   Gastrointestinal: Negative for abdominal pain, constipation, diarrhea, heartburn, hematochezia and nausea.   Breasts:  Negative for tenderness, breast mass and discharge.   Genitourinary: Negative for dysuria, frequency, genital sores, hematuria, pelvic pain, urgency, vaginal bleeding and vaginal discharge.   Musculoskeletal: Positive for arthralgias and myalgias. Negative for joint swelling.   Skin: Negative for rash.   Neurological: Negative for dizziness, weakness, headaches and paresthesias.   Psychiatric/Behavioral: Negative for mood changes. The patient is not  "nervous/anxious.         OBJECTIVE:   /81   Pulse 56   Temp 97.7  F (36.5  C) (Tympanic)   Resp 12   Ht 1.683 m (5' 6.25\")   Wt 66.7 kg (147 lb)   SpO2 98%   BMI 23.55 kg/m    Physical Exam  GENERAL: healthy, alert and no distress  EYES: Eyes grossly normal to inspection, PERRL and conjunctivae and sclerae normal  HENT: ear canals and TM's normal, nose and mouth without ulcers or lesions  NECK: no adenopathy, no asymmetry, masses, or scars and thyroid normal to palpation  RESP: lungs clear to auscultation - no rales, rhonchi or wheezes  BREAST: normal without masses, tenderness or nipple discharge and no palpable axillary masses or adenopathy  CV: regular rate and rhythm, normal S1 S2, no S3 or S4, no murmur, click or rub, no peripheral edema and peripheral pulses strong  ABDOMEN: soft, nontender, no hepatosplenomegaly, no masses and bowel sounds normal  MS: no gross musculoskeletal defects noted, no edema  SKIN: no suspicious lesions or rashes  NEURO: Normal strength and tone, mentation intact and speech normal  PSYCH: mentation appears normal, affect normal/bright    Diagnostic Test Results:  No results found for any visits on 04/15/22.    ASSESSMENT/PLAN:   Jessie was seen today for physical.    Diagnoses and all orders for this visit:    Routine general medical examination at a health care facility    Need for hepatitis C screening test  Comments:  Ordered for future.  Orders:  -     Hepatitis C Screen Reflex to HCV RNA Quant and Genotype; Future    Colon cancer screening  -     Adult Gastro Ref - Procedure Only; Future    Exercise-induced asthma  Comments:  Stable, doing well.  Orders:  -     albuterol (VENTOLIN HFA) 108 (90 Base) MCG/ACT inhaler; Inhale 1-2 puffs into the lungs every 6 hours as needed for shortness of breath / dyspnea (before exercise)    Atrophic vaginitis  Comments:  Discussed OTC options versus vaginal estrogen.  Patient will consider OTC options such as Replens    Other " "orders  -     TDAP VACCINE (Adacel, Boostrix)  [[id number]]        Patient has been advised of split billing requirements and indicates understanding: Yes    COUNSELING:  Reviewed preventive health counseling, as reflected in patient instructions    Estimated body mass index is 23.55 kg/m  as calculated from the following:    Height as of this encounter: 1.683 m (5' 6.25\").    Weight as of this encounter: 66.7 kg (147 lb).        She reports that she quit smoking about 24 years ago. Her smoking use included cigarettes. She smoked 0.50 packs per day. She has never used smokeless tobacco.      Counseling Resources:  ATP IV Guidelines  Pooled Cohorts Equation Calculator  Breast Cancer Risk Calculator  BRCA-Related Cancer Risk Assessment: FHS-7 Tool  FRAX Risk Assessment  ICSI Preventive Guidelines  Dietary Guidelines for Americans, 2010  USDA's MyPlate  ASA Prophylaxis  Lung CA Screening    Kasia Dias MD PhD  [facility]  "

## 2022-04-20 ENCOUNTER — TELEPHONE (OUTPATIENT)
Dept: FAMILY MEDICINE | Facility: CLINIC | Age: 52
End: 2022-04-20
Payer: COMMERCIAL

## 2022-04-20 NOTE — TELEPHONE ENCOUNTER
Please call back that it is for colonoscopy. The referral is written as for procedure only. Not for consult. We can refax the GI order if needed.

## 2022-04-20 NOTE — TELEPHONE ENCOUNTER
[facility] called because provider placed a referral on 4/15/22.    Want clarification if the referral is to get a colonoscopy done or for a consultation.    Can place a new referral or call [phone] option 1.      Routing to provider.      Yue Sherwood RN  [facility]

## 2022-04-20 NOTE — TELEPHONE ENCOUNTER
Spoke with Loren at [facility], made her aware this is for the procedure only, she notated in patients chart

## 2022-08-04 ENCOUNTER — ANCILLARY PROCEDURE (OUTPATIENT)
Dept: MAMMOGRAPHY | Facility: CLINIC | Age: 52
End: 2022-08-04
Attending: INTERNAL MEDICINE
Payer: COMMERCIAL

## 2022-08-04 DIAGNOSIS — Z12.31 VISIT FOR SCREENING MAMMOGRAM: ICD-10-CM

## 2022-08-04 PROCEDURE — 77063 BREAST TOMOSYNTHESIS BI: CPT | Performed by: RADIOLOGY

## 2022-08-04 PROCEDURE — 77067 SCR MAMMO BI INCL CAD: CPT | Performed by: RADIOLOGY

## 2022-10-22 ENCOUNTER — HEALTH MAINTENANCE LETTER (OUTPATIENT)
Age: 52
End: 2022-10-22

## 2022-11-07 ENCOUNTER — TRANSFERRED RECORDS (OUTPATIENT)
Dept: HEALTH INFORMATION MANAGEMENT | Facility: CLINIC | Age: 52
End: 2022-11-07

## 2023-04-05 NOTE — TELEPHONE ENCOUNTER
DIAGNOSIS: On going left glute/hip pain   APPOINTMENT DATE: 04/20/2023   NOTES STATUS DETAILS   OFFICE NOTE from referring provider N/A    OFFICE NOTE from other specialist N/A    DISCHARGE SUMMARY from hospital N/A    DISCHARGE REPORT from the ER N/A    OPERATIVE REPORT N/A    EMG report N/A    MEDICATION LIST N/A    MRI N/A    DEXA (osteoporosis/bone health) N/A    CT SCAN N/A    XRAYS (IMAGES & REPORTS) N/A

## 2023-04-20 ENCOUNTER — ANCILLARY PROCEDURE (OUTPATIENT)
Dept: GENERAL RADIOLOGY | Facility: CLINIC | Age: 53
End: 2023-04-20
Attending: PREVENTIVE MEDICINE
Payer: COMMERCIAL

## 2023-04-20 ENCOUNTER — OFFICE VISIT (OUTPATIENT)
Dept: ORTHOPEDICS | Facility: CLINIC | Age: 53
End: 2023-04-20
Payer: COMMERCIAL

## 2023-04-20 ENCOUNTER — PRE VISIT (OUTPATIENT)
Dept: ORTHOPEDICS | Facility: CLINIC | Age: 53
End: 2023-04-20
Payer: COMMERCIAL

## 2023-04-20 DIAGNOSIS — M54.16 LUMBAR RADICULAR PAIN: Primary | ICD-10-CM

## 2023-04-20 DIAGNOSIS — M54.16 LUMBAR RADICULAR PAIN: ICD-10-CM

## 2023-04-20 DIAGNOSIS — M25.559 HIP PAIN, UNSPECIFIED LATERALITY: ICD-10-CM

## 2023-04-20 DIAGNOSIS — M51.16 LUMBAR DISC HERNIATION WITH RADICULOPATHY: ICD-10-CM

## 2023-04-20 PROCEDURE — 99204 OFFICE O/P NEW MOD 45 MIN: CPT | Performed by: PREVENTIVE MEDICINE

## 2023-04-20 PROCEDURE — 73522 X-RAY EXAM HIPS BI 3-4 VIEWS: CPT | Performed by: RADIOLOGY

## 2023-04-20 PROCEDURE — 72100 X-RAY EXAM L-S SPINE 2/3 VWS: CPT | Performed by: RADIOLOGY

## 2023-04-20 RX ORDER — MELOXICAM 15 MG/1
15 TABLET ORAL DAILY
Qty: 30 TABLET | Refills: 0 | Status: SHIPPED | OUTPATIENT
Start: 2023-04-20 | End: 2023-05-17

## 2023-04-20 NOTE — PROGRESS NOTES
HISTORY OF PRESENT ILLNESS  Ms. Candelario is a pleasant 52 year old year old female who presents to clinic today with the following:  What problem are you here for? Left hip pain, lumbar pain  X 3 years  Low back has started having more low back pain  How long have you had this problem? 3 years for the hip, 3 weeks for her back    Have you had any recent imaging of this problem? Xrays/MRI/CT scans? no    Have you had treatments for this problem in the past?  -Medications? no  -Physical therapy? no  -Injections? no  -Surgery? no    How severe is this problem today? 0-10 scale? 3    How severe has this problem been at WORST in the past? 0-10 scale? 5    What do you think caused this problem? Had an injury to her hip 3 years ago    Does this problem or its symptoms cause difficulty for you falling asleep or staying asleep? no    Anything else you want us to know about this problem? no          MEDICAL HISTORY  Patient Active Problem List   Diagnosis     CARDIOVASCULAR SCREENING; LDL GOAL LESS THAN 160     TMJ (temporomandibular joint syndrome)     ASCUS with positive high risk HPV     Exercise-induced asthma     Dense breast     Del Rosario's neuralgia, left     Plantar fasciitis of left foot     Acute appendicitis with localized peritonitis, unspecified whether abscess present, unspecified whether gangrene present, unspecified whether perforation present       Current Outpatient Medications   Medication Sig Dispense Refill     albuterol (VENTOLIN HFA) 108 (90 Base) MCG/ACT inhaler Inhale 1-2 puffs into the lungs every 6 hours as needed for shortness of breath / dyspnea (before exercise) 18 g 3     Calcium Carb-Cholecalciferol (OYSTER SHELL CALCIUM) 500-400 MG-UNIT TABS Take 1 tablet by mouth daily       co-enzyme Q-10 100 MG CAPS capsule Take 50 mg by mouth daily        ibuprofen (ADVIL,MOTRIN) 200 MG tablet Take 800 mg by mouth every 6 hours as needed        Multiple Vitamins-Iron TABS Take 1 tablet by mouth daily        Omega-3 Fatty Acids (FISH OIL) 1200 MG capsule Take 1,200 mg by mouth daily       tretinoin (RETIN-A) 0.025 % external cream APPLY TOPICALLY TO FACE AT BEDTIME, STARTING EVERY OTHER NIGHT, BUILD UP TO NIGHTLY AS TOLERATED         No Known Allergies    Family History   Problem Relation Age of Onset     Heart Disease Mother      Hypertension Mother      Alcohol/Drug Brother         alcoholic     Cancer Maternal Grandmother         melanoma     Heart Disease Maternal Grandfather         arrythemia     Heart Disease Paternal Grandfather      Alcohol/Drug Brother         alcoholic     Social History     Socioeconomic History     Marital status:    Tobacco Use     Smoking status: Former     Packs/day: 0.50     Types: Cigarettes     Quit date: 1998     Years since quittin.2     Smokeless tobacco: Never   Substance and Sexual Activity     Alcohol use: Yes     Comment: wine 6-7 glasses  per week     Drug use: No     Sexual activity: Yes     Partners: Male       Additional medical/Social/Surgical histories reviewed in [facility] and updated as appropriate.     REVIEW OF SYSTEMS (2023)  10 point ROS of systems including Constitutional, Eyes, Respiratory, Cardiovascular, Gastroenterology, Genitourinary, Integumentary, Musculoskeletal, Psychiatric, Allergic/Immunologic were all negative except for pertinent positives noted in my HPI.     PHYSICAL EXAM  VSS    General  - normal appearance, in no obvious distress  HEENT  - conjunctivae not injected, moist mucous membranes, normocephalic/atraumatic head, ears normal appearance, no lesions, mouth normal appearance, no scars, normal dentition and teeth present  CV  - normal peripheral perfusion  Pulm  - normal respiratory pattern, non-labored  Musculoskeletal - lumbar spine  - stance: normal gait without limp, no obvious leg length discrepancy, normal heel and toe walk  - inspection: normal bone and joint alignment, no obvious scoliosis  - palpation: no paravertebral  or bony tenderness  - ROM: flexion exacerbates pain, normal extension, sidebending, rotation  - strength: lower extremities 5/5 in all planes, some gluteal weakness on left vs. right  - special tests:  (+) straight leg raise- left  (+) slump test  Neuro  - patellar and Achilles DTRs 2+ bilaterally, no lower extremity sensory deficit throughout L5 distribution, grossly normal coordination, normal muscle tone  Skin  - no ecchymosis, erythema, warmth, or induration, no obvious rash  Psych  - interactive, appropriate, normal mood and affect    ASSESSMENT & PLAN  51 yo female with left hip pain, gluteal pain, lumbar ddd, disc herniation, radicular pain    I independently reviewed the following imaging studies:  Lumbar xray: lumbar ddd  Bilateral hip xray: no abnormalities  Given HEP  Discussed and ordered lumbar MRI due to chronicity of pain and gluteal weakness    F/u after MRI  Consider PT and ELMA  RX given for mobic  Patient has been doing home exercise physical therapy program for this problem      Appropriate PPE was utilized for prevention of spread of Covid-19.  Hiren Stout MD, CAM

## 2023-04-20 NOTE — LETTER
4/20/2023         RE: Jessie Candelario  [address] [address] [place] [address]        Dear Colleague,    Thank you for referring your patient, Jessie Candelario, to the [facility] [place]. Please see a copy of my visit note below.    HISTORY OF PRESENT ILLNESS  Ms. Candelario is a pleasant 52 year old year old female who presents to clinic today with the following:  What problem are you here for? Left hip pain, lumbar pain  X 3 years  Low back has started having more low back pain  How long have you had this problem? 3 years for the hip, 3 weeks for her back    Have you had any recent imaging of this problem? Xrays/MRI/CT scans? no    Have you had treatments for this problem in the past?  -Medications? no  -Physical therapy? no  -Injections? no  -Surgery? no    How severe is this problem today? 0-10 scale? 3    How severe has this problem been at WORST in the past? 0-10 scale? 5    What do you think caused this problem? Had an injury to her hip 3 years ago    Does this problem or its symptoms cause difficulty for you falling asleep or staying asleep? no    Anything else you want us to know about this problem? no          MEDICAL HISTORY  Patient Active Problem List   Diagnosis     CARDIOVASCULAR SCREENING; LDL GOAL LESS THAN 160     TMJ (temporomandibular joint syndrome)     ASCUS with positive high risk HPV     Exercise-induced asthma     Dense breast     Del Rosario's neuralgia, left     Plantar fasciitis of left foot     Acute appendicitis with localized peritonitis, unspecified whether abscess present, unspecified whether gangrene present, unspecified whether perforation present       Current Outpatient Medications   Medication Sig Dispense Refill     albuterol (VENTOLIN HFA) 108 (90 Base) MCG/ACT inhaler Inhale 1-2 puffs into the lungs every 6 hours as needed for shortness of breath / dyspnea (before exercise) 18 g 3     Calcium Carb-Cholecalciferol (OYSTER SHELL CALCIUM)  500-400 MG-UNIT TABS Take 1 tablet by mouth daily       co-enzyme Q-10 100 MG CAPS capsule Take 50 mg by mouth daily        ibuprofen (ADVIL,MOTRIN) 200 MG tablet Take 800 mg by mouth every 6 hours as needed        Multiple Vitamins-Iron TABS Take 1 tablet by mouth daily       Omega-3 Fatty Acids (FISH OIL) 1200 MG capsule Take 1,200 mg by mouth daily       tretinoin (RETIN-A) 0.025 % external cream APPLY TOPICALLY TO FACE AT BEDTIME, STARTING EVERY OTHER NIGHT, BUILD UP TO NIGHTLY AS TOLERATED         No Known Allergies    Family History   Problem Relation Age of Onset     Heart Disease Mother      Hypertension Mother      Alcohol/Drug Brother         alcoholic     Cancer Maternal Grandmother         melanoma     Heart Disease Maternal Grandfather         arrythemia     Heart Disease Paternal Grandfather      Alcohol/Drug Brother         alcoholic     Social History     Socioeconomic History     Marital status:    Tobacco Use     Smoking status: Former     Packs/day: 0.50     Types: Cigarettes     Quit date: 1998     Years since quittin.2     Smokeless tobacco: Never   Substance and Sexual Activity     Alcohol use: Yes     Comment: wine 6-7 glasses  per week     Drug use: No     Sexual activity: Yes     Partners: Male       Additional medical/Social/Surgical histories reviewed in [facility] and updated as appropriate.     REVIEW OF SYSTEMS (2023)  10 point ROS of systems including Constitutional, Eyes, Respiratory, Cardiovascular, Gastroenterology, Genitourinary, Integumentary, Musculoskeletal, Psychiatric, Allergic/Immunologic were all negative except for pertinent positives noted in my HPI.     PHYSICAL EXAM  VSS    General  - normal appearance, in no obvious distress  HEENT  - conjunctivae not injected, moist mucous membranes, normocephalic/atraumatic head, ears normal appearance, no lesions, mouth normal appearance, no scars, normal dentition and teeth present  CV  - normal peripheral  perfusion  Pulm  - normal respiratory pattern, non-labored  Musculoskeletal - lumbar spine  - stance: normal gait without limp, no obvious leg length discrepancy, normal heel and toe walk  - inspection: normal bone and joint alignment, no obvious scoliosis  - palpation: no paravertebral or bony tenderness  - ROM: flexion exacerbates pain, normal extension, sidebending, rotation  - strength: lower extremities 5/5 in all planes, some gluteal weakness on left vs. right  - special tests:  (+) straight leg raise- left  (+) slump test  Neuro  - patellar and Achilles DTRs 2+ bilaterally, no lower extremity sensory deficit throughout L5 distribution, grossly normal coordination, normal muscle tone  Skin  - no ecchymosis, erythema, warmth, or induration, no obvious rash  Psych  - interactive, appropriate, normal mood and affect    ASSESSMENT & PLAN  51 yo female with left hip pain, gluteal pain, lumbar ddd, disc herniation, radicular pain    I independently reviewed the following imaging studies:  Lumbar xray: lumbar ddd  Bilateral hip xray: no abnormalities  Given HEP  Discussed and ordered lumbar MRI due to chronicity of pain and gluteal weakness    F/u after MRI  Consider PT and ELMA  RX given for mobic  Patient has been doing home exercise physical therapy program for this problem      Appropriate PPE was utilized for prevention of spread of Covid-19.  Hiren Stout MD, [facility]        Again, thank you for allowing me to participate in the care of your patient.        Sincerely,        Hiren Stout MD

## 2023-05-17 ENCOUNTER — ANCILLARY PROCEDURE (OUTPATIENT)
Dept: MRI IMAGING | Facility: CLINIC | Age: 53
End: 2023-05-17
Attending: PREVENTIVE MEDICINE
Payer: COMMERCIAL

## 2023-05-17 DIAGNOSIS — M54.16 LUMBAR RADICULAR PAIN: ICD-10-CM

## 2023-05-17 DIAGNOSIS — M25.559 HIP PAIN, UNSPECIFIED LATERALITY: ICD-10-CM

## 2023-05-17 DIAGNOSIS — M51.16 LUMBAR DISC HERNIATION WITH RADICULOPATHY: ICD-10-CM

## 2023-05-17 PROCEDURE — 72148 MRI LUMBAR SPINE W/O DYE: CPT | Performed by: RADIOLOGY

## 2023-05-17 RX ORDER — MELOXICAM 15 MG/1
TABLET ORAL
Qty: 30 TABLET | Refills: 0 | Status: SHIPPED | OUTPATIENT
Start: 2023-05-17 | End: 2023-06-28

## 2023-05-24 ENCOUNTER — HOSPITAL ENCOUNTER (OUTPATIENT)
Facility: CLINIC | Age: 53
End: 2023-05-24
Payer: COMMERCIAL

## 2023-05-25 ENCOUNTER — TELEPHONE (OUTPATIENT)
Dept: MEDSURG UNIT | Facility: CLINIC | Age: 53
End: 2023-05-25
Payer: COMMERCIAL

## 2023-05-26 ENCOUNTER — MYC MEDICAL ADVICE (OUTPATIENT)
Dept: ORTHOPEDICS | Facility: CLINIC | Age: 53
End: 2023-05-26
Payer: COMMERCIAL

## 2023-05-26 ENCOUNTER — TELEPHONE (OUTPATIENT)
Dept: ORTHOPEDICS | Facility: CLINIC | Age: 53
End: 2023-05-26
Payer: COMMERCIAL

## 2023-05-26 DIAGNOSIS — M51.16 LUMBAR DISC HERNIATION WITH RADICULOPATHY: ICD-10-CM

## 2023-05-26 DIAGNOSIS — M47.816 LUMBAR FACET ARTHROPATHY: Primary | ICD-10-CM

## 2023-05-26 DIAGNOSIS — M54.16 LUMBAR RADICULAR PAIN: ICD-10-CM

## 2023-05-26 NOTE — TELEPHONE ENCOUNTER
Called and spoke to patient, let her know that her insurance is requiring her to complete 3 months of physical therapy, or to pursue chiropractic care first before they approve an epidural injection.      Senthil Jung, EMT

## 2023-05-26 NOTE — TELEPHONE ENCOUNTER
Sent patient a [employer] message, gave her our scheduling line and consumer [employer] line.        Senthil Jung, EMT

## 2023-05-30 ENCOUNTER — THERAPY VISIT (OUTPATIENT)
Dept: PHYSICAL THERAPY | Facility: CLINIC | Age: 53
End: 2023-05-30
Attending: PREVENTIVE MEDICINE
Payer: COMMERCIAL

## 2023-05-30 DIAGNOSIS — M54.16 LUMBAR RADICULAR PAIN: ICD-10-CM

## 2023-05-30 DIAGNOSIS — G89.29 CHRONIC LEFT-SIDED LOW BACK PAIN WITHOUT SCIATICA: ICD-10-CM

## 2023-05-30 DIAGNOSIS — M51.16 LUMBAR DISC HERNIATION WITH RADICULOPATHY: ICD-10-CM

## 2023-05-30 DIAGNOSIS — M47.816 LUMBAR FACET ARTHROPATHY: ICD-10-CM

## 2023-05-30 DIAGNOSIS — M54.50 CHRONIC LEFT-SIDED LOW BACK PAIN WITHOUT SCIATICA: ICD-10-CM

## 2023-05-30 PROCEDURE — 97110 THERAPEUTIC EXERCISES: CPT | Mod: GP | Performed by: PHYSICAL THERAPIST

## 2023-05-30 PROCEDURE — 97161 PT EVAL LOW COMPLEX 20 MIN: CPT | Mod: GP | Performed by: PHYSICAL THERAPIST

## 2023-05-30 NOTE — PROGRESS NOTES
PHYSICAL THERAPvY EVALUATION  Type of Visit: Evaluation    See electronic medical record for Abuse and Falls Screening details.    Subjective      Presenting condition or subjective complaint: Patient reports she has had back pain for years but on 23 this year she worked out and cleaned the house but by the end of the day her back was killing her and felt super tight. this just seem to last longer then normal for about 6 weeks.  Date of onset: 23    Relevant medical history: Asthma   Dates & types of surgery: hernia repair, meniscus tear trimming,  appendectomy    Prior diagnostic imaging/testing results: MRI sleep better, run, golf.   Prior therapy history for the same diagnosis, illness or injury: Yes chirproctic care     Prior Level of Function   Transfers:   Ambulation:   ADL:   IADL:     Living Environment  Social support: With a significant other or spouse   Type of home: House   Stairs to enter the home:     Is there a railing: No   Ramp: No   Stairs inside the home: Yes 16 Is there a railing: Yes   Help at home: None  Equipment owned:       Employment: Yes nurse [employer]  Hobbies/Interests: running, golfing, walking, biking.    Patient goals for therapy:      Pain assessment: Location: left low back/Ratin/10PL     Objective   LUMBAR SPINE EVALUATION  PAIN: Pain Level at Rest: 0/10  Pain Level with Use: 4/10  Pain Location: lumbar spine  Pain Quality: Aching and Nagging  Pain Frequency: intermittent  Pain is Worst: nighttime  Pain is Exacerbated By: lying down, prolonged standing, prolonged sitting, running  Pain is Relieved By: ibuprofen,   Pain Progression: Worsened  INTEGUMENTARY (edema, incisions):   POSTURE:   GAIT:   Weightbearing Status:   Assistive Device(s):   Gait Deviations:   Balance/Proprioception:   Weight Bearing Alignment:   Non-Weight Bearing Alignment:    ROM:   (Degrees) Left AROM Left PROM  Right AROM Right PROM   Hip Flexion  120     Hip Extension       Hip  Abduction  45     Hip Adduction       Hip Internal Rotation  46     Hip External Rotation  55     Knee Flexion  full     Knee Extension  full     Lumbar Side glide Mod with increase left low back Right mild limitation.    Lumbar Flexion Lumbar flexion hands to toes no pain   Lumbar Extension Lumbar extension Moderate  Limitation no pain.    Pain:   End feel:      Test Movements:   During: produces, abolishes, increases, decreases, no effect, centralizing, peripheralizing   After: better, worse, no better, no worse, no effect, centralized, peripheralized    Symptomatic response Mechanical response    During testing After testing Effect - increased ROM, decreased ROM, or key functional test No Effect   Pretest symptoms standin/10 PL low back      Rep FIS No Effect    No Effect        Rep EIS Increases    No Worse              If required, pretest symptoms: 2/10 PL low back    During testing After testing Effect - increased ROM, decreased ROM, or key functional test No Effect   Rep SGIS - R Increases    No Worse        Rep SGIS - L Increases    Worse           Left Right   SLR Negative  Negative    SLR with DF     Femoral Nerve     Slump Negative  Negative    Medhat (Lumbar)     Medhat (Thoracic)     Medhat (Cervical)     Median     Ulnar     Radial        FLEXIBILITY:   LUMBAR/HIP Special Tests:    PELVIS/SI SPECIAL TESTS:   FUNCTIONAL TESTS:   PALPATION: + pain over left erector spinae  SPINAL SEGMENTAL CONCLUSIONS:    Left Right   T10     T11     T12     L1     L2 Hypo Hypo   L3 Hypo Hypo   L4 Hypo Hypo   L5 Hypo Hypo   S1 Hypo Hypo         Assessment & Plan   CLINICAL IMPRESSIONS   Medical Diagnosis: Lumbar disc herniation with radiculopathy  Lumbar facet arthropathy  Lumbar radicular pain    Treatment Diagnosis: low back pain   Impression/Assessment: Patient is a 52 year old female with low back pain complaints.  The following significant findings have been identified: Pain, Decreased ROM/flexibility, Decreased  joint mobility and Decreased activity tolerance. These impairments interfere with their ability to perform recreational activities and household chores as compared to previous level of function.     Clinical Decision Making (Complexity):   Clinical Presentation: Stable/Uncomplicated  Clinical Presentation Rationale: based on medical and personal factors listed in PT evaluation  Clinical Decision Making (Complexity): Low complexity    PLAN OF CARE  Treatment Interventions:  Interventions: Manual Therapy, Neuromuscular Re-education, Therapeutic Activity, Therapeutic Exercise    Long Term Goals     PT Goal 1  Goal Identifier: prolonged standing  Goal Description: pt will be able to stand 30 min 1/10 PL or less  Rationale: to maximize safety and independence with performance of ADLs and functional tasks  Target Date: 06/27/23  PT Goal 2  Goal Identifier: sleeping  Goal Description: pt will be able to sleep through the night without use of meds.  Rationale: to maximize safety and independence with performance of ADLs and functional tasks  Target Date: 07/11/23      Frequency of Treatment: 1 x/ week  Duration of Treatment: 6 weeks    Recommended Referrals to Other Professionals:   Education Assessment:   Learner/Method: Patient;Pictures/Video;No Barriers to Learning    Risks and benefits of evaluation/treatment have been explained.   Patient/Family/caregiver agrees with Plan of Care.     Evaluation Time:     PT Eval, Low Complexity Minutes (65065): 24   Present: Not applicable    Signing Clinician: Ashutosh Sanchez, PT

## 2023-06-01 ENCOUNTER — HEALTH MAINTENANCE LETTER (OUTPATIENT)
Age: 53
End: 2023-06-01

## 2023-06-15 ENCOUNTER — OFFICE VISIT (OUTPATIENT)
Dept: FAMILY MEDICINE | Facility: CLINIC | Age: 53
End: 2023-06-15
Payer: COMMERCIAL

## 2023-06-15 VITALS
HEART RATE: 62 BPM | SYSTOLIC BLOOD PRESSURE: 127 MMHG | RESPIRATION RATE: 14 BRPM | BODY MASS INDEX: 24.38 KG/M2 | TEMPERATURE: 98.8 F | DIASTOLIC BLOOD PRESSURE: 81 MMHG | OXYGEN SATURATION: 96 % | WEIGHT: 151.7 LBS | HEIGHT: 66 IN

## 2023-06-15 DIAGNOSIS — Z12.31 ENCOUNTER FOR SCREENING MAMMOGRAM FOR MALIGNANT NEOPLASM OF BREAST: ICD-10-CM

## 2023-06-15 DIAGNOSIS — Z00.00 ROUTINE GENERAL MEDICAL EXAMINATION AT A HEALTH CARE FACILITY: Primary | ICD-10-CM

## 2023-06-15 DIAGNOSIS — Z13.21 ENCOUNTER FOR VITAMIN DEFICIENCY SCREENING: ICD-10-CM

## 2023-06-15 DIAGNOSIS — Z11.59 NEED FOR HEPATITIS C SCREENING TEST: ICD-10-CM

## 2023-06-15 DIAGNOSIS — Z79.1 NSAID LONG-TERM USE: ICD-10-CM

## 2023-06-15 DIAGNOSIS — R63.5 WEIGHT GAIN: ICD-10-CM

## 2023-06-15 DIAGNOSIS — J45.990 EXERCISE-INDUCED ASTHMA: ICD-10-CM

## 2023-06-15 PROCEDURE — 99213 OFFICE O/P EST LOW 20 MIN: CPT | Mod: 25 | Performed by: INTERNAL MEDICINE

## 2023-06-15 PROCEDURE — 99396 PREV VISIT EST AGE 40-64: CPT | Performed by: INTERNAL MEDICINE

## 2023-06-15 RX ORDER — ALBUTEROL SULFATE 90 UG/1
1-2 AEROSOL, METERED RESPIRATORY (INHALATION) EVERY 6 HOURS PRN
Qty: 18 G | Refills: 3 | Status: SHIPPED | OUTPATIENT
Start: 2023-06-15 | End: 2024-02-01

## 2023-06-15 ASSESSMENT — ENCOUNTER SYMPTOMS
NAUSEA: 0
PARESTHESIAS: 0
JOINT SWELLING: 0
ABDOMINAL PAIN: 0
PALPITATIONS: 0
CHILLS: 0
FREQUENCY: 0
EYE PAIN: 0
DIARRHEA: 0
CONSTIPATION: 0
NERVOUS/ANXIOUS: 0
HEADACHES: 0
DIZZINESS: 0
FEVER: 0
WEAKNESS: 0
HEMATOCHEZIA: 0
HEMATURIA: 0
MYALGIAS: 1
ARTHRALGIAS: 1
BREAST MASS: 0
COUGH: 0
HEARTBURN: 0
SHORTNESS OF BREATH: 0
DYSURIA: 0
SORE THROAT: 0

## 2023-06-15 ASSESSMENT — ASTHMA QUESTIONNAIRES
ACT_TOTALSCORE: 23
ACT_TOTALSCORE: 23
QUESTION_2 LAST FOUR WEEKS HOW OFTEN HAVE YOU HAD SHORTNESS OF BREATH: NOT AT ALL
QUESTION_1 LAST FOUR WEEKS HOW MUCH OF THE TIME DID YOUR ASTHMA KEEP YOU FROM GETTING AS MUCH DONE AT WORK, SCHOOL OR AT HOME: NONE OF THE TIME
QUESTION_5 LAST FOUR WEEKS HOW WOULD YOU RATE YOUR ASTHMA CONTROL: COMPLETELY CONTROLLED
QUESTION_3 LAST FOUR WEEKS HOW OFTEN DID YOUR ASTHMA SYMPTOMS (WHEEZING, COUGHING, SHORTNESS OF BREATH, CHEST TIGHTNESS OR PAIN) WAKE YOU UP AT NIGHT OR EARLIER THAN USUAL IN THE MORNING: NOT AT ALL
QUESTION_4 LAST FOUR WEEKS HOW OFTEN HAVE YOU USED YOUR RESCUE INHALER OR NEBULIZER MEDICATION (SUCH AS ALBUTEROL): TWO OR THREE TIMES PER WEEK

## 2023-06-15 NOTE — LETTER
My Asthma Action Plan    Name: Jessie Candelario   YOB: 1970  Date: 6/15/2023   My doctor: Kasia Dias MD PhD   My clinic: [facility]        My Rescue Medicine:   Albuterol inhaler (Proair/Ventolin/Proventil HFA)  2-4 puffs EVERY 4 HOURS as needed. Use a spacer if recommended by your provider.   My Asthma Severity:   Intermittent / Exercise Induced  Know your asthma triggers: exercise or sports             GREEN ZONE   Good Control  I feel good  No cough or wheeze  Can work, sleep and play without asthma symptoms       Take your asthma control medicine every day.     If exercise triggers your asthma, take your rescue medication  15 minutes before exercise or sports, and  During exercise if you have asthma symptoms  Spacer to use with inhaler: If you have a spacer, make sure to use it with your inhaler             YELLOW ZONE Getting Worse  I have ANY of these:  I do not feel good  Cough or wheeze  Chest feels tight  Wake up at night   Keep taking your Green Zone medications  Start taking your rescue medicine:  every 20 minutes for up to 1 hour. Then every 4 hours for 24-48 hours.  If you stay in the Yellow Zone for more than 12-24 hours, contact your doctor.  If you do not return to the Green Zone in 12-24 hours or you get worse, start taking your oral steroid medicine if prescribed by your provider.           RED ZONE Medical Alert - Get Help  I have ANY of these:  I feel awful  Medicine is not helping  Breathing getting harder  Trouble walking or talking  Nose opens wide to breathe       Take your rescue medicine NOW  If your provider has prescribed an oral steroid medicine, start taking it NOW  Call your doctor NOW  If you are still in the Red Zone after 20 minutes and you have not reached your doctor:  Take your rescue medicine again and  Call 911 or go to the emergency room right away    See your regular doctor within 2 weeks of an Emergency Room or Urgent Care visit for  follow-up treatment.          Annual Reminders:  Meet with Asthma Educator,  Flu Shot in the Fall, consider Pneumonia Vaccination for patients with asthma (aged 19 and older).    Pharmacy:    [facility] [place] [facility] [place] [place] - [id number]  [id number] - [place] - [address] [facility]    Electronically signed by Kasia Dias MD PhD   Date: 06/15/23                    Asthma Triggers  How To Control Things That Make Your Asthma Worse    Triggers are things that make your asthma worse.  Look at the list below to help you find your triggers and   what you can do about them. You can help prevent asthma flare-ups by staying away from your triggers.      Trigger                                                          What you can do   Cigarette Smoke  Tobacco smoke can make asthma worse. Do not allow smoking in your home, car or around you.  Be sure no one smokes at a child s day care or school.  If you smoke, ask your health care provider for ways to help you quit.  Ask family members to quit too.  Ask your health care provider for a referral to Quit Plan to help you quit smoking, or call 3-962-254-PLAN.     Colds, Flu, Bronchitis  These are common triggers of asthma. Wash your hands often.  Don t touch your eyes, nose or mouth.  Get a flu shot every year.     Dust Mites  These are tiny bugs that live in cloth or carpet. They are too small to see. Wash sheets and blankets in hot water every week.   Encase pillows and mattress in dust mite proof covers.  Avoid having carpet if you can. If you have carpet, vacuum weekly.   Use a dust mask and HEPA vacuum.   Pollen and Outdoor Mold  Some people are allergic to trees, grass, or weed pollen, or molds. Try to keep your windows closed.  Limit time out doors when pollen count is high.   Ask you health care provider about taking medicine during allergy season.     Animal Dander  Some people are allergic  to skin flakes, urine or saliva from pets with fur or feathers. Keep pets with fur or feathers out of your home.    If you can t keep the pet outdoors, then keep the pet out of your bedroom.  Keep the bedroom door closed.  Keep pets off cloth furniture and away from stuffed toys.     Mice, Rats, and Cockroaches  Some people are allergic to the waste from these pests.   Cover food and garbage.  Clean up spills and food crumbs.  Store grease in the refrigerator.   Keep food out of the bedroom.   Indoor Mold  This can be a trigger if your home has high moisture. Fix leaking faucets, pipes, or other sources of water.   Clean moldy surfaces.  Dehumidify basement if it is damp and smelly.   Smoke, Strong Odors, and Sprays  These can reduce air quality. Stay away from strong odors and sprays, such as perfume, powder, hair spray, paints, smoke incense, paint, cleaning products, candles and new carpet.   Exercise or Sports  Some people with asthma have this trigger. Be active!  Ask your doctor about taking medicine before sports or exercise to prevent symptoms.    Warm up for 5-10 minutes before and after sports or exercise.     Other Triggers of Asthma  Cold air:  Cover your nose and mouth with a scarf.  Sometimes laughing or crying can be a trigger.  Some medicines and food can trigger asthma.

## 2023-06-15 NOTE — PROGRESS NOTES
SUBJECTIVE:   CC: Jessie is an 52 year old who presents for preventive health visit.        View : No data to display.                Has gained some weight around the holidays but unable to get back to her baseline. Used to be able to lose it easily with exercise. Wonders about her thyroid level.     History of exercise induced asthma. Uses albuterol prn. Needs refill.     Her son is getting  later today and she will be having first grandbaby in August.     History of low back pain. Uses nsaids long term. Recently saw sports medicine for this, given Meloxicam.     Healthy Habits:    Getting at least 3 servings of Calcium per day:  Yes    Bi-annual eye exam:  Yes    Dental care twice a year:  Yes    Sleep apnea or symptoms of sleep apnea:  None    Diet:  Regular (no restrictions)    Frequency of exercise:  4-5 days/week    Duration of exercise:  15-30 minutes    Taking medications regularly:  Yes    Medication side effects:  None    PHQ-2 Total Score:    Additional concerns today:  Yes          Social History     Tobacco Use     Smoking status: Former     Packs/day: 0.50     Types: Cigarettes     Quit date: 1998     Years since quittin.4     Smokeless tobacco: Never   Vaping Use     Vaping status: Never Used   Substance Use Topics     Alcohol use: Yes     Comment: wine 6-7 glasses  per week             6/15/2023     2:02 PM   Alcohol Use   Prescreen: >3 drinks/day or >7 drinks/week? No     Reviewed orders with patient.  Reviewed health maintenance and updated orders accordingly - Yes  Labs reviewed in EPIC    Breast Cancer Screenin/15/2022    10:31 AM   Breast CA Risk Assessment (FHS-7)   Do you have a family history of breast, colon, or ovarian cancer? No / Unknown         Mammogram Screening: Recommended annual mammography  Pertinent mammograms are reviewed under the imaging tab.    History of abnormal Pap smear: NO - age 30-65 PAP every 5 years with negative HPV co-testing  "recommended      Latest Ref Rng & Units 10/22/2020     2:09 PM 10/22/2020     2:04 PM 5/20/2015    10:40 AM   PAP / HPV   PAP (Historical)   NIL      HPV 16 DNA NEG^Negative Negative    Negative     HPV 18 DNA NEG^Negative Negative    Negative     Other HR HPV NEG^Negative Negative    Negative       Reviewed and updated as needed this visit by clinical staff   Tobacco  Allergies  Meds              Reviewed and updated as needed this visit by Provider                     Review of Systems   Constitutional: Negative for chills and fever.   HENT: Negative for congestion, ear pain, hearing loss and sore throat.    Eyes: Negative for pain and visual disturbance.   Respiratory: Negative for cough and shortness of breath.    Cardiovascular: Negative for chest pain, palpitations and peripheral edema.   Gastrointestinal: Negative for abdominal pain, constipation, diarrhea, heartburn, hematochezia and nausea.   Breasts:  Negative for tenderness, breast mass and discharge.   Genitourinary: Negative for dysuria, frequency, genital sores, hematuria, pelvic pain, urgency, vaginal bleeding and vaginal discharge.   Musculoskeletal: Positive for arthralgias (sees sports medicine for left back pain) and myalgias. Negative for joint swelling.   Skin: Negative for rash.   Neurological: Negative for dizziness, weakness, headaches and paresthesias.   Psychiatric/Behavioral: Negative for mood changes. The patient is not nervous/anxious.           OBJECTIVE:   /81 (BP Location: Right arm, Patient Position: Sitting, Cuff Size: Adult Regular)   Pulse 62   Temp 98.8  F (37.1  C) (Tympanic)   Resp 14   Ht 1.676 m (5' 6\")   Wt 68.8 kg (151 lb 11.2 oz)   SpO2 96%   BMI 24.49 kg/m    Physical Exam  GENERAL APPEARANCE: healthy, alert and no distress  EYES: Eyes grossly normal to inspection, PERRL and conjunctivae and sclerae normal  HENT: ear canals and TM's normal, nose and mouth without ulcers or lesions, oropharynx clear and " oral mucous membranes moist  NECK: no adenopathy, no asymmetry, masses, or scars and thyroid normal to palpation  RESP: lungs clear to auscultation - no rales, rhonchi or wheezes  CV: regular rate and rhythm, normal S1 S2, no S3 or S4, no murmur, click or rub, no peripheral edema and peripheral pulses strong  ABDOMEN: soft, nontender, no hepatosplenomegaly, no masses and bowel sounds normal  MS: no musculoskeletal defects are noted and gait is age appropriate without ataxia  NEURO: Normal strength and tone, sensory exam grossly normal, mentation intact and speech normal  PSYCH: mentation appears normal and affect normal/bright    Diagnostic Test Results:  No results found for this or any previous visit (from the past 24 hour(s)).    ASSESSMENT/PLAN:   Jessie was seen today for physical.    Diagnoses and all orders for this visit:    Routine general medical examination at a health care facility    Exercise-induced asthma  Comments:  Stable, doing well.  Orders:  -     albuterol (VENTOLIN HFA) 108 (90 Base) MCG/ACT inhaler; Inhale 1-2 puffs into the lungs every 6 hours as needed for shortness of breath (before exercise)  -     TSH with free T4 reflex; Future    Need for hepatitis C screening test  -     Hepatitis C Screen Reflex to HCV RNA Quant and Genotype; Future    Weight gain    Encounter for screening mammogram for malignant neoplasm of breast  -     *MA Screening Digital Bilateral; Future    NSAID long-term use  -     Creatinine; Future    Encounter for vitamin deficiency screening  -     Vitamin D Deficiency; Future    Other orders  -     REVIEW OF HEALTH MAINTENANCE PROTOCOL ORDERS              COUNSELING:  Reviewed preventive health counseling, as reflected in patient instructions        She reports that she quit smoking about 25 years ago. Her smoking use included cigarettes. She smoked an average of .5 packs per day. She has never used smokeless tobacco.      Kasia Dias MD PhD  [facility]  [place]

## 2023-06-21 ENCOUNTER — THERAPY VISIT (OUTPATIENT)
Dept: PHYSICAL THERAPY | Facility: CLINIC | Age: 53
End: 2023-06-21
Payer: COMMERCIAL

## 2023-06-21 DIAGNOSIS — M54.50 CHRONIC LEFT-SIDED LOW BACK PAIN WITHOUT SCIATICA: Primary | ICD-10-CM

## 2023-06-21 DIAGNOSIS — G89.29 CHRONIC LEFT-SIDED LOW BACK PAIN WITHOUT SCIATICA: Primary | ICD-10-CM

## 2023-06-21 PROCEDURE — 97110 THERAPEUTIC EXERCISES: CPT | Mod: GP

## 2023-06-26 DIAGNOSIS — M54.16 LUMBAR RADICULAR PAIN: ICD-10-CM

## 2023-06-26 DIAGNOSIS — M25.559 HIP PAIN, UNSPECIFIED LATERALITY: ICD-10-CM

## 2023-06-26 NOTE — TELEPHONE ENCOUNTER
Prescription refill requested for:   meloxicam (MOBIC) 15 MG tablet      Last Written Prescription Date:  5/17/23  Last Fill Quantity: 30,   # refills: 0  Last Office Visit: 4/20/23  Future Office visit:    Next 5 appointments (look out 90 days)    Jul 10, 2023  3:00 PM  Lab visit with [facility] LAB ONLY  [facility] ([facility] ) [address] [place] [id number]  [phone]               Chaitanya Khanna, ATC

## 2023-06-28 RX ORDER — MELOXICAM 15 MG/1
TABLET ORAL
Qty: 30 TABLET | Refills: 0 | Status: SHIPPED | OUTPATIENT
Start: 2023-06-28

## 2023-07-03 ENCOUNTER — VIRTUAL VISIT (OUTPATIENT)
Dept: ORTHOPEDICS | Facility: CLINIC | Age: 53
End: 2023-07-03
Payer: COMMERCIAL

## 2023-07-03 DIAGNOSIS — M51.16 LUMBAR DISC HERNIATION WITH RADICULOPATHY: Primary | ICD-10-CM

## 2023-07-03 DIAGNOSIS — M47.816 LUMBAR FACET ARTHROPATHY: ICD-10-CM

## 2023-07-03 PROCEDURE — 99214 OFFICE O/P EST MOD 30 MIN: CPT | Mod: 93 | Performed by: PREVENTIVE MEDICINE

## 2023-07-03 NOTE — PROGRESS NOTES
Patient is a   52  year old who is being evaluated via a billable telephone visit.      What phone number would you like to be contacted at? CELL  How would you like to obtain your AVS? PEÑA Keith   Patient is a   52  year old who presents by phone call visit for the following:     SILVIA   Jessie is following up for her low back pain, chronic, with radicular symptoms into her legs, worse on right leg  This has persisted, despite continuing physical therapy exercises which she started per my guidance and recommendation at her 4/20/23 appointment she has been doing these exercises both on her own and with physical therapy visits.  Her pain has persisted,   We have reviewed her lumbar MRI and it shows areas of arthritis and lumbar nerve impingement and would benefit at this point from a lumbar injection  She has been prescribed nsaids which help manage some of her symptoms but has not resolved    Review of Systems   Constitutional, HEENT, cardiovascular, pulmonary, gi and gu systems are negative, except as otherwise noted.      Objective           Vitals:  No vitals were obtained today due to virtual visit.    Physical Exam   healthy, alert and no distress  PSYCH: Alert and oriented times 3; coherent speech, normal   rate and volume, able to articulate logical thoughts, able   to abstract reason, no tangential thoughts, no hallucinations   or delusions  His affect is normal  RESP: No cough, no audible wheezing, able to talk in full sentences  Remainder of exam unable to be completed due to telephone visits    Assessment/Plan  53 yo female with lumbar ddd, disc herniations, radicular pain, ,not resolved      I independently reviewed the following imaging studies and discussed with patient:  Lumbar MRI: shows ddd, disc herniations, facet arthropathy  Discussed and ordered lumbar epidural injection which will be approved now that she has completed 3 months of physical therapy exercises and programming since  April of this year.  Cont. mobic PRN  F/u after ELMA          Phone call duration: 20 minutes  Phone call start: 750am  Phone call end: 810am  Dr Stout

## 2023-07-03 NOTE — LETTER
7/3/2023         RE: Jessie Candelario  [address] [facility] [address]        Dear Colleague,    Thank you for referring your patient, Jessie Candelario, to the [facility] [place]. Please see a copy of my visit note below.    Patient is a   52  year old who is being evaluated via a billable telephone visit.      What phone number would you like to be contacted at? CELL  How would you like to obtain your AVS? MYCHART        Subjective   Patient is a   52  year old who presents by phone call visit for the following:     HPI   Jessie is following up for her low back pain, chronic, with radicular symptoms into her legs, worse on right leg  This has persisted, despite continuing physical therapy exercises which she started per my guidance and recommendation at her 4/20/23 appointment she has been doing these exercises both on her own and with physical therapy visits.  Her pain has persisted,   We have reviewed her lumbar MRI and it shows areas of arthritis and lumbar nerve impingement and would benefit at this point from a lumbar injection  She has been prescribed nsaids which help manage some of her symptoms but has not resolved    Review of Systems   Constitutional, HEENT, cardiovascular, pulmonary, gi and gu systems are negative, except as otherwise noted.      Objective           Vitals:  No vitals were obtained today due to virtual visit.    Physical Exam   healthy, alert and no distress  PSYCH: Alert and oriented times 3; coherent speech, normal   rate and volume, able to articulate logical thoughts, able   to abstract reason, no tangential thoughts, no hallucinations   or delusions  His affect is normal  RESP: No cough, no audible wheezing, able to talk in full sentences  Remainder of exam unable to be completed due to telephone visits    Assessment/Plan  51 yo female with lumbar ddd, disc herniations, radicular pain, ,not resolved      I independently reviewed the  following imaging studies and discussed with patient:  Lumbar MRI: shows ddd, disc herniations, facet arthropathy  Discussed and ordered lumbar epidural injection which will be approved now that she has completed 3 months of physical therapy exercises and programming since April of this year.  Cont. mobic PRN  F/u after ELMA          Phone call duration: 20 minutes  Phone call start: 750am  Phone call end: 810am  Dr Stout      Again, thank you for allowing me to participate in the care of your patient.        Sincerely,        Hiren Stout MD

## 2023-07-03 NOTE — LETTER
7/3/2023         RE: Jessie Candelario  [address] [facility] [address]        Dear Colleague,    Thank you for referring your patient, Jessie Candelario, to the [facility] [place]. Please see a copy of my visit note below.    Patient is a   52  year old who is being evaluated via a billable telephone visit.      What phone number would you like to be contacted at? CELL  How would you like to obtain your AVS? MYCHART        Subjective   Patient is a   52  year old who presents by phone call visit for the following:     HPI   Jessie is following up for her low back pain, chronic, with radicular symptoms into her legs, worse on right leg  This has persisted, despite continuing physical therapy exercises which she started per my guidance and recommendation at her 4/20/23 appointment she has been doing these exercises both on her own and with physical therapy visits.  Her pain has persisted,   We have reviewed her lumbar MRI and it shows areas of arthritis and lumbar nerve impingement and would benefit at this point from a lumbar injection  She has been prescribed nsaids which help manage some of her symptoms but has not resolved    Review of Systems   Constitutional, HEENT, cardiovascular, pulmonary, gi and gu systems are negative, except as otherwise noted.      Objective           Vitals:  No vitals were obtained today due to virtual visit.    Physical Exam   healthy, alert and no distress  PSYCH: Alert and oriented times 3; coherent speech, normal   rate and volume, able to articulate logical thoughts, able   to abstract reason, no tangential thoughts, no hallucinations   or delusions  His affect is normal  RESP: No cough, no audible wheezing, able to talk in full sentences  Remainder of exam unable to be completed due to telephone visits    Assessment/Plan  51 yo female with lumbar ddd, disc herniations, radicular pain, ,not resolved      I independently reviewed the  following imaging studies and discussed with patient:  Lumbar MRI: shows ddd, disc herniations, facet arthropathy  Discussed and ordered lumbar epidural injection which will be approved now that she has completed 3 months of physical therapy exercises and programming since April of this year.  Cont. mobic PRN  F/u after [facility]          Phone call duration: 20 minutes  Phone call start: 750am  Phone call end: 810am  Dr Stout      Again, thank you for allowing me to participate in the care of your patient.        Sincerely,        Hiren Stout MD

## 2023-07-05 ENCOUNTER — TELEPHONE (OUTPATIENT)
Dept: MEDSURG UNIT | Facility: CLINIC | Age: 53
End: 2023-07-05
Payer: COMMERCIAL

## 2023-07-10 ENCOUNTER — HOSPITAL ENCOUNTER (OUTPATIENT)
Facility: CLINIC | Age: 53
Discharge: HOME | End: 2023-07-10
Admitting: PHYSICIAN ASSISTANT
Payer: COMMERCIAL

## 2023-07-10 ENCOUNTER — HOSPITAL ENCOUNTER (OUTPATIENT)
Dept: GENERAL RADIOLOGY | Facility: CLINIC | Age: 53
Discharge: HOME | End: 2023-07-10
Attending: PREVENTIVE MEDICINE
Payer: COMMERCIAL

## 2023-07-10 ENCOUNTER — LAB (OUTPATIENT)
Dept: LAB | Facility: CLINIC | Age: 53
End: 2023-07-10
Payer: COMMERCIAL

## 2023-07-10 VITALS
SYSTOLIC BLOOD PRESSURE: 132 MMHG | DIASTOLIC BLOOD PRESSURE: 78 MMHG | OXYGEN SATURATION: 98 % | TEMPERATURE: 96.6 F | RESPIRATION RATE: 16 BRPM | HEART RATE: 53 BPM

## 2023-07-10 VITALS — HEART RATE: 62 BPM | DIASTOLIC BLOOD PRESSURE: 88 MMHG | OXYGEN SATURATION: 97 % | SYSTOLIC BLOOD PRESSURE: 133 MMHG

## 2023-07-10 DIAGNOSIS — M47.816 LUMBAR FACET ARTHROPATHY: ICD-10-CM

## 2023-07-10 DIAGNOSIS — Z11.59 NEED FOR HEPATITIS C SCREENING TEST: ICD-10-CM

## 2023-07-10 DIAGNOSIS — J45.990 EXERCISE-INDUCED ASTHMA: ICD-10-CM

## 2023-07-10 DIAGNOSIS — Z79.1 NSAID LONG-TERM USE: ICD-10-CM

## 2023-07-10 DIAGNOSIS — M51.16 LUMBAR DISC HERNIATION WITH RADICULOPATHY: ICD-10-CM

## 2023-07-10 DIAGNOSIS — Z13.21 ENCOUNTER FOR VITAMIN DEFICIENCY SCREENING: ICD-10-CM

## 2023-07-10 LAB
CREAT SERPL-MCNC: 0.83 MG/DL (ref 0.51–0.95)
DEPRECATED CALCIDIOL+CALCIFEROL SERPL-MC: 57 UG/L (ref 20–75)
GFR SERPL CREATININE-BSD FRML MDRD: 84 ML/MIN/1.73M2
HCV AB SERPL QL IA: NONREACTIVE
TSH SERPL DL<=0.005 MIU/L-ACNC: 0.41 UIU/ML (ref 0.3–4.2)

## 2023-07-10 PROCEDURE — 36415 COLL VENOUS BLD VENIPUNCTURE: CPT

## 2023-07-10 PROCEDURE — 250N000009 HC RX 250: Performed by: PREVENTIVE MEDICINE

## 2023-07-10 PROCEDURE — 250N000011 HC RX IP 250 OP 636: Performed by: PREVENTIVE MEDICINE

## 2023-07-10 PROCEDURE — 82565 ASSAY OF CREATININE: CPT

## 2023-07-10 PROCEDURE — 82306 VITAMIN D 25 HYDROXY: CPT

## 2023-07-10 PROCEDURE — 84443 ASSAY THYROID STIM HORMONE: CPT

## 2023-07-10 PROCEDURE — 62323 NJX INTERLAMINAR LMBR/SAC: CPT

## 2023-07-10 PROCEDURE — 86803 HEPATITIS C AB TEST: CPT

## 2023-07-10 PROCEDURE — 255N000002 HC RX 255 OP 636: Mod: JZ | Performed by: PREVENTIVE MEDICINE

## 2023-07-10 PROCEDURE — 999N000154 HC STATISTIC RADIOLOGY XRAY, US, CT, MAR, NM

## 2023-07-10 RX ORDER — IOPAMIDOL 408 MG/ML
10 INJECTION, SOLUTION INTRATHECAL ONCE
Status: COMPLETED | OUTPATIENT
Start: 2023-07-10 | End: 2023-07-10

## 2023-07-10 RX ORDER — NICOTINE POLACRILEX 4 MG
15-30 LOZENGE BUCCAL
Status: DISCONTINUED | OUTPATIENT
Start: 2023-07-10 | End: 2023-07-11 | Stop reason: HOSPADM

## 2023-07-10 RX ORDER — BETAMETHASONE SODIUM PHOSPHATE AND BETAMETHASONE ACETATE 3; 3 MG/ML; MG/ML
3 INJECTION, SUSPENSION INTRA-ARTICULAR; INTRALESIONAL; INTRAMUSCULAR; SOFT TISSUE ONCE
Status: COMPLETED | OUTPATIENT
Start: 2023-07-10 | End: 2023-07-10

## 2023-07-10 RX ORDER — DEXTROSE MONOHYDRATE 25 G/50ML
25-50 INJECTION, SOLUTION INTRAVENOUS
Status: DISCONTINUED | OUTPATIENT
Start: 2023-07-10 | End: 2023-07-11 | Stop reason: HOSPADM

## 2023-07-10 RX ADMIN — BETAMETHASONE SODIUM PHOSPHATE AND BETAMETHASONE ACETATE 3 ML: 3; 3 INJECTION, SUSPENSION INTRA-ARTICULAR; INTRALESIONAL; INTRAMUSCULAR at 08:47

## 2023-07-10 RX ADMIN — LIDOCAINE HYDROCHLORIDE 2.5 ML: 10 INJECTION, SOLUTION EPIDURAL; INFILTRATION; INTRACAUDAL; PERINEURAL at 08:47

## 2023-07-10 RX ADMIN — LIDOCAINE HYDROCHLORIDE 3 ML: 10 INJECTION, SOLUTION EPIDURAL; INFILTRATION; INTRACAUDAL; PERINEURAL at 08:42

## 2023-07-10 RX ADMIN — IOPAMIDOL 1.5 ML: 408 INJECTION, SOLUTION INTRATHECAL at 08:44

## 2023-07-10 ASSESSMENT — ACTIVITIES OF DAILY LIVING (ADL): ADLS_ACUITY_SCORE: 35

## 2023-07-10 NOTE — DISCHARGE INSTRUCTIONS
Steroid Injection Discharge Instructions     After you go home:    You may resume your normal diet.    Care of Puncture Site:    If you have a bandaid on your puncture site, you may remove it the next morning  You may shower tomorrow  No bath tubs, whirlpools or swimming pool for at least 48 hours  Use ice packs as needed for discomfort     Activity:    Minimize your activity today. You may gradually resume your normal activity as tolerated  Avoid vigorous or strenuous activity until your symptoms improve or as directed by your doctor  Do NOT drive a vehicle for a few hours after the injection - or longer if you develop numbness in your arm or leg    Medicines:    You may resume all medications, including blood thinners  Resume your Warfarin/Coumadin at your regular dose today. Follow up with your provider to have your INR rechecked  Resume your Platelet Inhibitors and Aspirin tomorrow at your regular dose  For minor discomfort, you may take Acetaminophen (Tylenol) or Ibuprofen (Advil)    Pain:     You may experience increased or different pain over the next 24-48 hours  For the next 48 hrs - you may use ice packs for discomfort     Call your primary care doctor if:    You have severe pain that does not improve with pain medication  You have chills or a fever greater than 101 F (38 C)  The site is red, swollen, hot or tender  Increase in pain, weakness or numbness  New problems with your bowel or bladder  Any questions or concerns    What to watch for:    It can be normal to have some bruising or slight swelling at the puncture site.   After the procedure, you may have some new weakness or numbness down your arm/leg from the numbing medicine. This should resolve in a few hours.   You may feel some temporary relief from the numbing medicine, but that will wear off within a few hours.  Your symptoms may return to pre procedure level, or can even be worse for the first 1-2 days.  For many people, the steroid begins to  provide some relief within 2-3 days, but it can take up to 2 weeks to obtain the full results.  Some people will get lasting relief from a single injection. Others may require up to 3 injections to get results. If you have more than one steroid injection, they should be given 2 weeks apart.  If you have no improvement in your symptoms after two weeks, please contact the doctor who ordered this procedure to discuss the next steps.  Side effects of your steroid injection are mild and will go away in 2-3 days  Insomnia  Irritability  Flushed face  Water retention  Restlessness  Difficulty sleeping  Increased appetite  Increased blood sugar  If you are diabetic, monitor your blood sugar closely. Contact the provider who manages your diabetes to help you control your blood sugar if needed.    If you have questions or concerns call:                  [facility] Radiology Dept @ [phone]                                    between 8am-4:30pm Mon-Fri    If you have urgent questions outside of these normal business hours, please contact the [facility] on call doctor @ [phone]      The provider who performed your procedure was __Silvio KHAN_.

## 2023-07-10 NOTE — PROGRESS NOTES
0900 Pt returned from radiology. Bandaid CDI to lumbar region. Pt denies pain, numbness or tingling at this time.      0910 Discharge instructions given to pt. All questions & concerns addressed.    0918 OOB - steady on feet. Ambulates w/o difficulty. Bandaid CDI to puncture site.    0920 Pt discharged per w/c to private vehicle. All personal belongings taken with pt.     Bactrim Pregnancy And Lactation Text: This medication is Pregnancy Category D and is known to cause fetal risk.  It is also excreted in breast milk.

## 2023-08-10 ENCOUNTER — ANCILLARY PROCEDURE (OUTPATIENT)
Dept: MAMMOGRAPHY | Facility: CLINIC | Age: 53
End: 2023-08-10
Attending: INTERNAL MEDICINE
Payer: COMMERCIAL

## 2023-08-10 DIAGNOSIS — Z12.31 VISIT FOR SCREENING MAMMOGRAM: ICD-10-CM

## 2023-08-10 PROCEDURE — 77063 BREAST TOMOSYNTHESIS BI: CPT | Mod: GC

## 2023-08-10 PROCEDURE — 77067 SCR MAMMO BI INCL CAD: CPT | Mod: GC

## 2023-08-14 NOTE — PROGRESS NOTES
06/21/23 0500   Appointment Info   Signing clinician's name / credentials Phoenix Gant, PT, DPT, CSCS, CLT   Total/Authorized Visits 6 E&T   Visits Used 2   Medical Diagnosis Lumbar disc herniation with radiculopathy  Lumbar facet arthropathy  Lumbar radicular pain   PT Tx Diagnosis low back pain left   Progress Note/Certification   Onset of illness/injury or Date of Surgery 05/26/23   Therapy Frequency 1 x/ week   Predicted Duration 6 weeks   Progress Note Due Date 07/25/23   Progress Note Completed Date 05/30/23       Present No   GOALS   PT Goals 2;3   PT Goal 1   Goal Identifier prolonged standing   Goal Description pt will be able to stand 30 min 1/10 PL or less   Rationale to maximize safety and independence with performance of ADLs and functional tasks   Target Date 06/27/23   PT Goal 2   Goal Identifier sleeping   Goal Description pt will be able to sleep through the night without use of meds.   Rationale to maximize safety and independence with performance of ADLs and functional tasks   Target Date 07/11/23   Subjective Report   Subjective Report She reports that when she remembered to do them, maybe the extensions have helped.  They may be a little better, but recently slight worse.   Objective Measures   Objective Measures Objective Measure 1;Objective Measure 2;Objective Measure 3;Objective Measure 4   Objective Measure 3   Objective Measure Pain   Details 0/10   Treatment Interventions (PT)   Interventions Therapeutic Procedure/Exercise   Therapeutic Procedure/Exercise   Therapeutic Procedures: strength, endurance, ROM, flexibillity minutes (60448) 38   PTRx Ther Proc 1 Prone Press Ups   PTRx Ther Proc 1 - Details 1x10    PTRx Ther Proc 2 Lumbar Flexion in Standing with/without Patient Overpressure   PTRx Ther Proc 2 - Details 1x10 (No effect)   PTRx Ther Proc 3 Lumbar Flexion Rotation   PTRx Ther Proc 3 - Details 1x10 (Felt slight discomfort at end range and intial rotation).   No success with reps   PTRx Ther Proc 4 Gluteal Myofascial Full Arc   PTRx Ther Proc 4 - Details 1x30 1x10   PTRx Ther Proc 5 Gluteal Myofascial Piriformis Cruncher   PTRx Ther Proc 5 - Details 1x30 1x10   Skilled Intervention Modify HEP to Core Stab   Patient Response/Progress Tolerated well and temporarily eliminated pain   PTRx Ther Proc 6 Supine Abdominal Exercise #8 (Toe Taps)   PTRx Ther Proc 6 - Details Tried Core recruitment initially with partial shoulder lift off for rectus 2x10 TA contractions in supine 2x10 and multifidi recruitment pushing low back into the table 2x10.  Then tried dead bug but poor coordination 1x15 each side.  Switched to toe taps 2x30 each side   Education   Learner/Method Patient;Pictures/Video;No Barriers to Learning   Plan   Home program created   Updates to plan of care Switched to Core Stab which worked well in clinic   Plan for next session No directional preference, but was able to calm the pain with glute recruitment and core recruitment. Gave core stab / glute work. If responding well to this at home, give for GM protocol, full kallie [facility] big 3 program with core engagement, and some direct lumbar strength. Then possibly d/c with independent management or 1 more in future to ensure success   Total Session Time   Timed Code Treatment Minutes 38   Total Treatment Time (sum of timed and untimed services) 38         DISCHARGE  Reason for Discharge: Patient chooses to discontinue therapy.    Equipment Issued: None    Discharge Plan: Patient to continue home program.    Referring Provider:  Hiren Stout

## 2024-02-01 ENCOUNTER — MYC REFILL (OUTPATIENT)
Dept: FAMILY MEDICINE | Facility: CLINIC | Age: 54
End: 2024-02-01
Payer: COMMERCIAL

## 2024-02-01 DIAGNOSIS — J45.990 EXERCISE-INDUCED ASTHMA: ICD-10-CM

## 2024-02-01 RX ORDER — ALBUTEROL SULFATE 90 UG/1
1-2 AEROSOL, METERED RESPIRATORY (INHALATION) EVERY 6 HOURS PRN
Qty: 18 G | Refills: 3 | Status: SHIPPED | OUTPATIENT
Start: 2024-02-01 | End: 2024-06-21

## 2024-05-10 NOTE — PROGRESS NOTES
"Date: 10/17/2020 17:20:06  Clinician: Annette Mcclain  Clinician NPI: [id number]  Patient: Jessie Candelario  Patient : 1970  Patient Address: [address] [place] [id number]  Patient Phone: [phone]  Visit Protocol: URI  Patient Summary:  Jessie is a 50 year old ( : 1970 ) female who initiated a [facility] Visit for COVID-19 (Coronavirus) evaluation and screening. When asked the question \"Please sign me up to receive news, health information and promotions from [facility].\", Jessie responded \"No\".    Jessie states her symptoms started gradually 3-4 days ago. After her symptoms started, they improved and then got worse again.   Her symptoms consist of a headache, rhinitis, nausea, facial pain or pressure, myalgia, chills, and malaise.   Symptom details     Nasal secretions: The color of her mucus is yellow.    Facial pain or pressure: The facial pain or pressure does not feel worse when bending or leaning forward.     Headache: She states the headache is moderate (4-6 on a 10 point pain scale).      Jessie denies having ear pain, wheezing, fever, enlarged lymph nodes, cough, nasal congestion, anosmia, vomiting, sore throat, teeth pain, ageusia, and diarrhea. She also denies having a sinus infection within the past year, taking antibiotic medication in the past month, and having recent facial or sinus surgery in the past 60 days. She is not experiencing dyspnea.   Precipitating events  She has not recently been exposed to someone with influenza. Jessie has not been in close contact with any high risk individuals.   Pertinent COVID-19 (Coronavirus) information  In the past 14 days, Jessie has not worked in a congregate living setting.   She either works or volunteers as a healthcare worker or a , or works or volunteers in a healthcare facility. 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 She lives with a healthcare worker.   Jessie has had a close contact with a laboratory-confirmed COVID-19 patient within 14 days of symptom onset. She was not exposed at her work. Additional information about contact with COVID-19 (Coronavirus) patient as reported by the patient (free text): I work with Covid patients daily, I'm not aware of an exposure where I was not wearing my PPE   Since December 2019, Jessie and has not had upper respiratory infection or influenza-like illness. Has not been diagnosed with lab-confirmed COVID-19 test   Pertinent medical history  Jessie does not get yeast infections when she takes antibiotics.   Jessie does not need a return to work/school note.   Weight: 149 lbs   Jessie does not smoke or use smokeless tobacco.   Weight: 149 lbs    MEDICATIONS: CoQ-10 oral, Probiotic-Digestive Enzymes oral, omega-3-epa-dha-fish oil-flaxseed oil-vitamin E oral, ALLERGIES: NKDA  Clinician Response:  Dear Jessie,   Your symptoms show that you may have coronavirus (COVID-19). This illness can cause fever, cough and trouble breathing. Many people get a mild case and get better on their own. Some people can get very sick.  What should I do?  We would like to test you for this virus.   1. Please call [phone] to schedule your visit. Explain that you were referred by [facility] to have a COVID-19 test. Be ready to share your [facility] visit ID number.  The following will serve as your written order for this COVID Test, ordered by me, for the indication of suspected COVID [Z20.828]: The test will be ordered in [facility], our electronic health record, after you are scheduled. It will show as ordered and authorized by Ehsan Abdullahi MD.  Order: COVID-19 (Coronavirus) PCR for SYMPTOMATIC testing from [facility].      2. When it's time for your COVID test:  Stay at least 6 feet away from others. (If someone  "will drive you to your test, stay in the backseat, as far away from the  as you can.)   Cover your mouth and nose with a mask, tissue or washcloth.  Go straight to the testing site. Don't make any stops on the way there or back.      3.Starting now: Stay home and away from others (self-isolate) until:   You've had no fever---and no medicine that reduces fever---for one full day (24 hours). And...   Your other symptoms have gotten better. For example, your cough or breathing has improved. And...   At least 10 days have passed since your symptoms started.       During this time, don't leave the house except for testing or medical care.   Stay in your own room, even for meals. Use your own bathroom if you can.   Stay away from others in your home. No hugging, kissing or shaking hands. No visitors.  Don't go to work, school or anywhere else.    Clean \"high touch\" surfaces often (doorknobs, counters, handles, etc.). Use a household cleaning spray or wipes. You'll find a full list of  on the EPA website: www.epa.gov/pesticide-registration/list-n-disinfectants-use-against-sars-cov-2.   Cover your mouth and nose with a mask, tissue or washcloth to avoid spreading germs.  Wash your hands and face often. Use soap and water.  Caregivers in these groups are at risk for severe illness due to COVID-19:  o People 65 years and older  o People who live in a nursing home or long-term care facility  o People with chronic disease (lung, heart, cancer, diabetes, kidney, liver, immunologic)  o People who have a weakened immune system, including those who:   Are in cancer treatment  Take medicine that weakens the immune system, such as corticosteroids  Had a bone marrow or organ transplant  Have an immune deficiency  Have poorly controlled HIV or AIDS  Are obese (body mass index of 40 or higher)  Smoke regularly   o Caregivers should wear gloves while washing dishes, handling laundry and cleaning bedrooms and bathrooms.  o " Use caution when washing and drying laundry: Don't shake dirty laundry, and use the warmest water setting that you can.  o For more tips, go to www.cdc.gov/coronavirus/2019-ncov/downloads/10Things.pdf.    4.Sign up for Alaina Pedersen. We know it's scary to hear that you might have COVID-19. We want to track your symptoms to make sure you're okay over the next 2 weeks. Please look for an email from Alaina Pedersen---this is a free, online program that we'll use to keep in touch. To sign up, follow the link in the email. Learn more at [email or web address]  How can I take care of myself?   Get lots of rest. Drink extra fluids (unless a doctor has told you not to).   Take Tylenol (acetaminophen) for fever or pain. If you have liver or kidney problems, ask your family doctor if it's okay to take Tylenol.   Adults can take either:    650 mg (two 325 mg pills) every 4 to 6 hours, or...   1,000 mg (two 500 mg pills) every 8 hours as needed.    Note: Don't take more than 3,000 mg in one day. Acetaminophen is found in many medicines (both prescribed and over-the-counter medicines). Read all labels to be sure you don't take too much.   For children, check the Tylenol bottle for the right dose. The dose is based on the child's age or weight.    If you have other health problems (like cancer, heart failure, an organ transplant or severe kidney disease): Call your specialty clinic if you don't feel better in the next 2 days.       Know when to call 911. Emergency warning signs include:    Trouble breathing or shortness of breath Pain or pressure in the chest that doesn't go away Feeling confused like you haven't felt before, or not being able to wake up Bluish-colored lips or face.  Where can I get more information?    [facility] -- About COVID-19: [email or web address]   CDC -- What to Do If You're Sick: www.cdc.gov/coronavirus/2019-ncov/about/steps-when-sick.html   CDC -- Ending Home Isolation:  www.cdc.gov/coronavirus/2019-ncov/hcp/disposition-in-home-patients.html   CDC -- Caring for Someone: www.cdc.gov/coronavirus/2019-ncov/if-you-are-sick/care-for-someone.html   [facility] -- Interim Guidance for Hospital Discharge to Home: www.health.[facility].[place]/diseases/coronavirus/hcp/hospdischarge.pdf   [facility] clinical trials (COVID-19 research studies): clinicalaffairs.[facility]-clinical-trials    Below are the COVID-19 hotlines at the [place] Department of Health ([facility]). Interpreters are available.    For health questions: Call 004-171-5679 or 1-883.976.6764 (7 a.m. to 7 p.m.) For questions about schools and childcare: Call 567-072-8418 or 1-896.604.9094 (7 a.m. to 7 p.m.)    COVID-19 (Coronavirus) General Information  Because there is currently no vaccine to prevent infection, the best way to protect yourself is to avoid being exposed to this virus. Common symptoms of COVID-19 include but are not limited to fever, cough, and shortness of breath. These symptoms appear 2-14 days after you are exposed to the virus that causes COVID-19. Click here for more information from the CDC on how to protect yourself.  If you are sick with COVID-19 or suspect you are infected with the virus that causes COVID-19, follow the steps here from the CDC to help prevent the disease from spreading to people in your home and community.  Click here for general information from the CDC on testing.  If you develop any of these emergency warning signs for COVID-19, get medical attention immediately:     Trouble breathing    Persistent pain or pressure in the chest    New confusion or inability to arouse    Bluish lips or face      Call your doctor or clinic before going in. Call [phone] if you have a medical emergency and notify the  you have or think you may have COVID-19.  For more detailed and up to date information on COVID-19 (Coronavirus), please visit the CDC website.   Diagnosis: Contact with and (suspected)  exposure to other viral communicable diseases  Diagnosis ICD: Z20.828   Yes

## 2024-06-21 ENCOUNTER — OFFICE VISIT (OUTPATIENT)
Dept: FAMILY MEDICINE | Facility: CLINIC | Age: 54
End: 2024-06-21
Payer: COMMERCIAL

## 2024-06-21 VITALS
RESPIRATION RATE: 15 BRPM | WEIGHT: 151.5 LBS | TEMPERATURE: 97.7 F | BODY MASS INDEX: 24.35 KG/M2 | HEART RATE: 68 BPM | OXYGEN SATURATION: 100 % | DIASTOLIC BLOOD PRESSURE: 76 MMHG | SYSTOLIC BLOOD PRESSURE: 116 MMHG | HEIGHT: 66 IN

## 2024-06-21 DIAGNOSIS — Z00.00 ROUTINE GENERAL MEDICAL EXAMINATION AT A HEALTH CARE FACILITY: Primary | ICD-10-CM

## 2024-06-21 DIAGNOSIS — Z12.4 CERVICAL CANCER SCREENING: ICD-10-CM

## 2024-06-21 DIAGNOSIS — M79.641 PAIN IN BOTH HANDS: ICD-10-CM

## 2024-06-21 DIAGNOSIS — Z12.31 ENCOUNTER FOR SCREENING MAMMOGRAM FOR MALIGNANT NEOPLASM OF BREAST: ICD-10-CM

## 2024-06-21 DIAGNOSIS — J45.990 EXERCISE-INDUCED ASTHMA: ICD-10-CM

## 2024-06-21 DIAGNOSIS — I73.00 RAYNAUD'S DISEASE WITHOUT GANGRENE: ICD-10-CM

## 2024-06-21 DIAGNOSIS — M79.642 PAIN IN BOTH HANDS: ICD-10-CM

## 2024-06-21 LAB
CRP SERPL-MCNC: <3 MG/L
ERYTHROCYTE [SEDIMENTATION RATE] IN BLOOD BY WESTERGREN METHOD: 4 MM/HR (ref 0–30)

## 2024-06-21 PROCEDURE — 86039 ANTINUCLEAR ANTIBODIES (ANA): CPT | Performed by: INTERNAL MEDICINE

## 2024-06-21 PROCEDURE — 86140 C-REACTIVE PROTEIN: CPT | Performed by: INTERNAL MEDICINE

## 2024-06-21 PROCEDURE — 99396 PREV VISIT EST AGE 40-64: CPT | Performed by: INTERNAL MEDICINE

## 2024-06-21 PROCEDURE — G0124 SCREEN C/V THIN LAYER BY MD: HCPCS | Performed by: PATHOLOGY

## 2024-06-21 PROCEDURE — 99213 OFFICE O/P EST LOW 20 MIN: CPT | Mod: 24 | Performed by: INTERNAL MEDICINE

## 2024-06-21 PROCEDURE — 86038 ANTINUCLEAR ANTIBODIES: CPT | Performed by: INTERNAL MEDICINE

## 2024-06-21 PROCEDURE — 85652 RBC SED RATE AUTOMATED: CPT | Performed by: INTERNAL MEDICINE

## 2024-06-21 PROCEDURE — 87624 HPV HI-RISK TYP POOLED RSLT: CPT | Performed by: INTERNAL MEDICINE

## 2024-06-21 PROCEDURE — 36415 COLL VENOUS BLD VENIPUNCTURE: CPT | Performed by: INTERNAL MEDICINE

## 2024-06-21 PROCEDURE — G0145 SCR C/V CYTO,THINLAYER,RESCR: HCPCS | Performed by: INTERNAL MEDICINE

## 2024-06-21 RX ORDER — ALBUTEROL SULFATE 90 UG/1
1-2 AEROSOL, METERED RESPIRATORY (INHALATION) EVERY 6 HOURS PRN
Qty: 18 G | Refills: 3 | Status: SHIPPED | OUTPATIENT
Start: 2024-06-21

## 2024-06-21 SDOH — HEALTH STABILITY: PHYSICAL HEALTH: ON AVERAGE, HOW MANY DAYS PER WEEK DO YOU ENGAGE IN MODERATE TO STRENUOUS EXERCISE (LIKE A BRISK WALK)?: 3 DAYS

## 2024-06-21 ASSESSMENT — ASTHMA QUESTIONNAIRES
ACT_TOTALSCORE: 24
ACT_TOTALSCORE: 25
QUESTION_2 LAST FOUR WEEKS HOW OFTEN HAVE YOU HAD SHORTNESS OF BREATH: NOT AT ALL
QUESTION_4 LAST FOUR WEEKS HOW OFTEN HAVE YOU USED YOUR RESCUE INHALER OR NEBULIZER MEDICATION (SUCH AS ALBUTEROL): ONCE A WEEK OR LESS
QUESTION_5 LAST FOUR WEEKS HOW WOULD YOU RATE YOUR ASTHMA CONTROL: COMPLETELY CONTROLLED
QUESTION_1 LAST FOUR WEEKS HOW MUCH OF THE TIME DID YOUR ASTHMA KEEP YOU FROM GETTING AS MUCH DONE AT WORK, SCHOOL OR AT HOME: NONE OF THE TIME
QUESTION_3 LAST FOUR WEEKS HOW OFTEN DID YOUR ASTHMA SYMPTOMS (WHEEZING, COUGHING, SHORTNESS OF BREATH, CHEST TIGHTNESS OR PAIN) WAKE YOU UP AT NIGHT OR EARLIER THAN USUAL IN THE MORNING: NOT AT ALL

## 2024-06-21 ASSESSMENT — PAIN SCALES - GENERAL: PAINLEVEL: NO PAIN (0)

## 2024-06-21 ASSESSMENT — SOCIAL DETERMINANTS OF HEALTH (SDOH): HOW OFTEN DO YOU GET TOGETHER WITH FRIENDS OR RELATIVES?: ONCE A WEEK

## 2024-06-21 NOTE — PATIENT INSTRUCTIONS
"Patient Education   Preventive Care Advice   This is general advice we often give to help people stay healthy. Your care team may have specific advice just for you. Please talk to your care team about your own preventive care needs.  Lifestyle  Exercise at least 150 minutes each week (30 minutes a day, 5 days a week).  Do muscle strengthening activities 2 days a week. These help control your weight and prevent disease.  No smoking.  Wear sunscreen to prevent skin cancer.  Have your home tested for radon every 2 to 5 years. Radon is a colorless, odorless gas that can harm your lungs. To learn more, go to www.health.[facility].[place] and search for \"Radon in Homes.\"  Keep guns unloaded and locked up in a safe place like a safe or gun vault, or, use a gun lock and hide the keys. Always lock away bullets separately. To learn more, visit [employer].[place].gov and search for \"safe gun storage.\"  Nutrition  Eat 5 or more servings of fruits and vegetables each day.  Try wheat bread, brown rice and whole grain pasta (instead of white bread, rice, and pasta).  Get enough calcium and vitamin D. Check the label on foods and aim for 100% of the RDA (recommended daily allowance).  Regular exams  Have a dental exam and cleaning every 6 months.  See your health care team every year to talk about:  Any changes in your health.  Any medicines your care team has prescribed.  Preventive care, family planning, and ways to prevent chronic diseases.  Shots (vaccines)   HPV shots (up to age 26), if you've never had them before.  Hepatitis B shots (up to age 59), if you've never had them before.  COVID-19 shot: Get this shot when it's due.  Flu shot: Get a flu shot every year.  Tetanus shot: Get a tetanus shot every 10 years.  Pneumococcal, hepatitis A, and RSV shots: Ask your care team if you need these based on your risk.  Shingles shot (for age 50 and up).  General health tests  Diabetes screening:  Starting at age 35, Get screened for diabetes at least " every 3 years.  If you are younger than age 35, ask your care team if you should be screened for diabetes.  Cholesterol test: At age 39, start having a cholesterol test every 5 years, or more often if advised.  Bone density scan (DEXA): At age 50, ask your care team if you should have this scan for osteoporosis (brittle bones).  Hepatitis C: Get tested at least once in your life.  Abdominal aortic aneurysm screening: Talk to your doctor about having this screening if you:  Have ever smoked; and  Are biologically male; and  Are between the ages of 65 and 75.  STIs (sexually transmitted infections)  Before age 24: Ask your care team if you should be screened for STIs.  After age 24: Get screened for STIs if you're at risk. You are at risk for STIs (including HIV) if:  You are sexually active with more than one person.  You don't use condoms every time.  You or a partner was diagnosed with a sexually transmitted infection.  If you are at risk for HIV, ask about PrEP medicine to prevent HIV.  Get tested for HIV at least once in your life, whether you are at risk for HIV or not.  Cancer screening tests  Cervical cancer screening: If you have a cervix, begin getting regular cervical cancer screening tests at age 21. Most people who have regular screenings with normal results can stop after age 65. Talk about this with your provider.  Breast cancer scan (mammogram): If you've ever had breasts, begin having regular mammograms starting at age 40. This is a scan to check for breast cancer.  Colon cancer screening: It is important to start screening for colon cancer at age 45.  Have a colonoscopy test every 10 years (or more often if you're at risk) Or, ask your provider about stool tests like a FIT test every year or Cologuard test every 3 years.  To learn more about your testing options, visit: [email or web address].  For help making a decision, visit: [email or web address]/[email or web address].  Prostate cancer screening test: If you have a  prostate and are age 55 to 69, ask your provider if you would benefit from a yearly prostate cancer screening test.  Lung cancer screening: If you are a current or former smoker age 50 to 80, ask your care team if ongoing lung cancer screenings are right for you.  For informational purposes only. Not to replace the advice of your health care provider. Copyright   2023 [facility]. All rights reserved. Clinically reviewed by the [facility]. [employer] [id number] - REV 04/24.

## 2024-06-21 NOTE — PROGRESS NOTES
Preventive Care Visit  [facility]  Kasia Dias MD PhD, Internal Medicine - Pediatrics  Jun 21, 2024      Assessment & Plan     Jessie was seen today for physical.    Diagnoses and all orders for this visit:    Routine general medical examination at a health care facility    Exercise-induced asthma  Comments:  Stable, doing well.  Orders:  -     albuterol (VENTOLIN HFA) 108 (90 Base) MCG/ACT inhaler; Inhale 1-2 puffs into the lungs every 6 hours as needed for shortness of breath (before exercise)    Cervical cancer screening  -     Pap Screen with HPV - Recommended Age 30 - 65 Years    Raynaud's disease without gangrene  -     Anti Nuclear Makenna IgG by IFA with Reflex; Future  -     Anti Nuclear Makenna IgG by IFA with Reflex    Pain in both hands  -     CRP, inflammation; Future  -     ESR: Erythrocyte sedimentation rate; Future  -     CRP, inflammation  -     ESR: Erythrocyte sedimentation rate    Encounter for screening mammogram for malignant neoplasm of breast  -     MA Screen Bilateral w/Jose Manuel; Future    Other orders  -     REVIEW OF HEALTH MAINTENANCE PROTOCOL ORDERS  -     PRIMARY CARE FOLLOW-UP SCHEDULING; Future                 Counseling  Appropriate preventive services were discussed with this patient, including applicable screening as appropriate for fall prevention, nutrition, physical activity, Tobacco-use cessation, weight loss and cognition.  Checklist reviewing preventive services available has been given to the patient.  Reviewed patient's diet, addressing concerns and/or questions.   She is at risk for lack of exercise and has been provided with information to increase physical activity for the benefit of her well-being.           Subjective   Jessie is a 53 year old, presenting for the following:  Physical (Annual exam)        6/21/2024    12:51 PM   Additional Questions   Roomed by Mallory DOLL   Accompanied by Self         6/21/2024    12:51 PM   Patient Reported Additional  Medications   Patient reports taking the following new medications None        Health Care Directive  Patient does not have a Health Care Directive or Living Will: Discussed advance care planning with patient; however, patient declined at this time.      Patient reported doing well.  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     Recently had life insurance screening for lipid/glucose liver enzymes, kidney functions etc. All normal.     Lately has had more raynaud in her hands. Turning white and  cold outdoors.   Some years ago, had an eye finding that was somewhat concern for autoimmune disorder by the eye doctor but not otherwise evaluated.   Mom has sarcoidosis in her 60/70s. No RA or SLE.   Pt reported more hand pain.     History of exercise induced asthma. Only uses albuterol before exercise, not all the time.     Had bad reaction to covid vaccines twice. Opted not to have future boosters.l            6/21/2024   General Health   How would you rate your overall physical health? Excellent   Feel stress (tense, anxious, or unable to sleep) To some extent      (!) STRESS CONCERN      6/21/2024   Nutrition   Three or more servings of calcium each day? Yes   Diet: Regular (no restrictions)   How many servings of fruit and vegetables per day? 4 or more   How many sweetened beverages each day? 0-1            6/21/2024   Exercise   Days per week of moderate/strenous exercise 3 days            6/21/2024   Social Factors   Frequency of gathering with friends or relatives Once a week   Worry food won't last until get money to buy more No   Food not last or not have enough money for food? No   Do you have housing? (Housing is defined as stable permanent housing and does not include staying ouside in a car, in a tent, in an abandoned building, in an overnight shelter, or couch-surfing.) Yes   Are you worried about losing your housing? No   Lack of transportation? No    Unable to get utilities (heat,electricity)? No            2024   Fall Risk   Fallen 2 or more times in the past year? No   Trouble with walking or balance? No             2024   Dental   Dentist two times every year? Yes            2024   TB Screening   Were you born outside of the [place]? No            Today's PHQ-2 Score:       2024    12:44 PM   PHQ-2 (  [employer])   Q1: Little interest or pleasure in doing things 0   Q2: Feeling down, depressed or hopeless 0   PHQ-2 Score 0   Q1: Little interest or pleasure in doing things Not at all   Q2: Feeling down, depressed or hopeless Not at all   PHQ-2 Score 0           2024   Substance Use   Alcohol more than 3/day or more than 7/wk No   Do you use any other substances recreationally? No        Social History     Tobacco Use    Smoking status: Former     Current packs/day: 0.00     Types: Cigarettes     Quit date: 1998     Years since quittin.4    Smokeless tobacco: Never   Vaping Use    Vaping status: Never Used   Substance Use Topics    Alcohol use: Yes     Comment: wine 6-7 glasses  per week    Drug use: No             2024   Breast Cancer Screening   Family history of breast, colon, or ovarian cancer? No / Unknown          8/10/2023   LAST FHS-7 RESULTS   1st degree relative breast or ovarian cancer No   Any relative bilateral breast cancer No   Any male have breast cancer No   Any ONE woman have BOTH breast AND ovarian cancer No   Any woman with breast cancer before 50yrs No   2 or more relatives with breast AND/OR ovarian cancer No   2 or more relatives with breast AND/OR bowel cancer No           Mammogram Screening - Mammogram every 1-2 years updated in Health Maintenance based on mutual decision making        2024   STI Screening   New sexual partner(s) since last STI/HIV test? No        History of abnormal Pap smear: No - age 30- 64 PAP with HPV every 5 years recommended        Latest Ref Rng & Units 10/22/2020      "2:09 PM 10/22/2020     2:04 PM 5/20/2015    10:40 AM   PAP / HPV   PAP (Historical)   NIL     HPV 16 DNA NEG^Negative Negative   Negative    HPV 18 DNA NEG^Negative Negative   Negative    Other HR HPV NEG^Negative Negative   Negative      ASCVD Risk   The 10-year ASCVD risk score (Jazzy RUEDA, et al., 2019) is: 0.7%    Values used to calculate the score:      Age: 53 years      Sex: Female      Is Non- : No      Diabetic: No      Tobacco smoker: No      Systolic Blood Pressure: 116 mmHg      Is BP treated: No      HDL Cholesterol: 87 mg/dL      Total Cholesterol: 175 mg/dL           Reviewed and updated as needed this visit by Provider                          Review of Systems  Constitutional, HEENT, cardiovascular, pulmonary, gi and gu systems are negative, except as otherwise noted.     Objective    Exam  /76 (BP Location: Right arm, Patient Position: Sitting, Cuff Size: Adult Regular)   Pulse 68   Temp 97.7  F (36.5  C) (Temporal)   Resp 15   Ht 1.676 m (5' 6\")   Wt 68.7 kg (151 lb 8 oz)   SpO2 100%   BMI 24.45 kg/m     Estimated body mass index is 24.45 kg/m  as calculated from the following:    Height as of this encounter: 1.676 m (5' 6\").    Weight as of this encounter: 68.7 kg (151 lb 8 oz).    Physical Exam  GENERAL: alert and no distress  EYES: Eyes grossly normal to inspection, PERRL and conjunctivae and sclerae normal  HENT: ear canals and TM's normal, nose and mouth without ulcers or lesions  NECK: no adenopathy, no asymmetry, masses, or scars  RESP: lungs clear to auscultation - no rales, rhonchi or wheezes  CV: regular rate and rhythm, normal S1 S2, no S3 or S4, no murmur, click or rub, no peripheral edema  ABDOMEN: soft, nontender, no hepatosplenomegaly, no masses and bowel sounds normal  MS: no gross musculoskeletal defects noted, no edema  SKIN: no suspicious lesions or rashes  NEURO: Normal strength and tone, mentation intact and speech normal  PSYCH: " mentation appears normal, affect normal/bright        Signed Electronically by: Kasia Dias MD PhD

## 2024-06-24 LAB
ANA PAT SER IF-IMP: ABNORMAL
ANA SER QL IF: ABNORMAL
ANA TITR SER IF: ABNORMAL {TITER}
HPV HR 12 DNA CVX QL NAA+PROBE: NEGATIVE
HPV16 DNA CVX QL NAA+PROBE: NEGATIVE
HPV18 DNA CVX QL NAA+PROBE: NEGATIVE
HUMAN PAPILLOMA VIRUS FINAL DIAGNOSIS: NORMAL

## 2024-06-27 LAB
BKR LAB AP GYN ADEQUACY: ABNORMAL
BKR LAB AP GYN INTERPRETATION: ABNORMAL
BKR LAB AP PREVIOUS ABNORMAL: ABNORMAL
PATH REPORT.COMMENTS IMP SPEC: ABNORMAL
PATH REPORT.COMMENTS IMP SPEC: ABNORMAL
PATH REPORT.RELEVANT HX SPEC: ABNORMAL

## 2024-08-23 ENCOUNTER — ANCILLARY PROCEDURE (OUTPATIENT)
Dept: MAMMOGRAPHY | Facility: CLINIC | Age: 54
End: 2024-08-23
Attending: INTERNAL MEDICINE
Payer: COMMERCIAL

## 2024-08-23 DIAGNOSIS — Z12.31 ENCOUNTER FOR SCREENING MAMMOGRAM FOR MALIGNANT NEOPLASM OF BREAST: ICD-10-CM

## 2024-08-23 PROCEDURE — 77067 SCR MAMMO BI INCL CAD: CPT | Mod: GC | Performed by: STUDENT IN AN ORGANIZED HEALTH CARE EDUCATION/TRAINING PROGRAM

## 2024-08-23 PROCEDURE — 77063 BREAST TOMOSYNTHESIS BI: CPT | Mod: GC | Performed by: STUDENT IN AN ORGANIZED HEALTH CARE EDUCATION/TRAINING PROGRAM

## 2024-09-20 ENCOUNTER — TELEPHONE (OUTPATIENT)
Dept: FAMILY MEDICINE | Facility: CLINIC | Age: 54
End: 2024-09-20
Payer: COMMERCIAL

## 2024-10-07 ENCOUNTER — TELEPHONE (OUTPATIENT)
Dept: FAMILY MEDICINE | Facility: CLINIC | Age: 54
End: 2024-10-07
Payer: COMMERCIAL

## 2024-10-07 NOTE — TELEPHONE ENCOUNTER
LVM for pt that appt has been cancelled and will need to be rescheduled due to provider out. Sent [employer] message.

## 2025-01-13 NOTE — TELEPHONE ENCOUNTER
REASON FOR VISIT: Left hip pain    DATE OF APPT: 1/25/2025   NOTES (FOR ALL VISITS) STATUS DETAILS   OFFICE NOTE from referring provider N/A    EMG N/A    MEDICATION LIST N/A    IMAGING  (FOR ALL VISITS)     XR Internal [facility]  XR Pelvis/hip bilateral 4/20/2023   MRI (HEAD, NECK, SPINE) N/A    CT (HEAD, NECK, SPINE) N/A

## 2025-01-25 ENCOUNTER — OFFICE VISIT (OUTPATIENT)
Dept: ORTHOPEDICS | Facility: CLINIC | Age: 55
End: 2025-01-25
Payer: COMMERCIAL

## 2025-01-25 ENCOUNTER — PRE VISIT (OUTPATIENT)
Dept: ORTHOPEDICS | Facility: CLINIC | Age: 55
End: 2025-01-25

## 2025-01-25 DIAGNOSIS — M25.552 LEFT HIP PAIN: Primary | ICD-10-CM

## 2025-01-25 PROCEDURE — 99214 OFFICE O/P EST MOD 30 MIN: CPT | Performed by: FAMILY MEDICINE

## 2025-01-25 NOTE — LETTER
1/25/2025      Jessie Candelario  [id number] [address] [place] [address]      Dear Colleague,    Thank you for referring your patient, Jessie Candelario, to the [facility] SPORTS MEDICINE [facility]. Please see a copy of my visit note below.      [facility]  SPORTS MEDICINE CLINIC VISIT     Jan 25, 2025        ASSESSMENT & PLAN    54-year-old with left lateral hip pain that is likely related to the gluteal tendons inserting on the greater trochanter    Reviewed imaging and assessment with patient in detail  Recommend initial course of physical therapy and referral was provided.  We discussed indication for steroid injection.  Will defer for now but she will follow-up with us in the future if she would like to pursue this option.  We discussed activities as tolerated and also briefly discussed activity modifications.    Henry Samson MD  [facility] SPORTS MEDICINE [facility]    -----  Chief Complaint   Patient presents with     Consult     Left hip pain       SUBJECTIVE  Jessie Candelario is a/an 54 year old female who is seen as a self referral for evaluation of Left hip pain.     The patient is seen by themselves.  The patient is Right handed    Onset: 1 month(s) ago. Reports insidious onset without acute precipitating event.  Location of Pain: left hip, on the side   Worsened by: lay on the left side   Better with: takes ibuprofen before bed   Treatments tried: rest/activity avoidance, ice, heat, and ibuprofen  Associated symptoms: none    Orthopedic/Surgical history: had pain in her hips before and received a back injection.   Social History/Occupation: Nurse Manager      REVIEW OF SYSTEMS:  Do you have fever, chills, weight loss? No  Do you have any vision problems? No  Do you have any chest pain or edema? No  Do you have any shortness of breath or wheezing?  No  Do you have stomach problems? No  Do you have any numbness or focal weakness? Has tingling in her hands due  to her neck  Do you have diabetes? No  Do you have problems with bleeding or clotting? No  Do you have an rashes or other skin lesions? No    OBJECTIVE:  There were no vitals taken for this visit.     Exam:  Patient is alert, in no acute distress, pleasant and conversational.    Gait: Nonantalgic.  Normal heel toe gait      left Hip:  Supine PROM:  Flexion: Approximately 115 , no tenderness.  External rotation: approximately 60 , no tenderness.  Internal rotation: Approximately 30 , no tenderness      Strength Testing:  Hip flexion: 5/5.  Hip adduction: 5/5.  Hip abduction: 4+/5.  With pain      Palpation:  positive  tenderness to palpation over the greater trochanter.  negative tenderness to palpation over psoas  negative tenderness to palpation over ASIS  negative tenderness to palpation over Iliac crest  negative tenderness to palpation along the piriformis.  negative tenderness to palpation of the SI joint    Special Tests:  positive  Haydee's test.   positive  VICENTE's test  negative FADIR's test  negative Scour test  negative Reported pain with resisted hip flexion to opposite shoulder     Neurovascularly intact in bilateral lower extremities      RADIOLOGY:    2 view xrays of left hip performed 4/20/2023 and reviewed independently demonstrating no acute fracture.  No significant DJD. See EMR for formal radiology report.                Again, thank you for allowing me to participate in the care of your patient.        Sincerely,    Henry Samson MD    Electronically signed

## 2025-01-25 NOTE — PROGRESS NOTES
[facility]  SPORTS MEDICINE CLINIC VISIT     Jan 25, 2025        ASSESSMENT & PLAN    54-year-old with left lateral hip pain that is likely related to the gluteal tendons inserting on the greater trochanter    Reviewed imaging and assessment with patient in detail  Recommend initial course of physical therapy and referral was provided.  We discussed indication for steroid injection.  Will defer for now but she will follow-up with us in the future if she would like to pursue this option.  We discussed activities as tolerated and also briefly discussed activity modifications.    Henry Samson MD  [facility] SPORTS MEDICINE [facility]    -----  Chief Complaint   Patient presents with    Consult     Left hip pain       SUBJECTIVE  Jessie Candelario is a/an 54 year old female who is seen as a self referral for evaluation of Left hip pain.     The patient is seen by themselves.  The patient is Right handed    Onset: 1 month(s) ago. Reports insidious onset without acute precipitating event.  Location of Pain: left hip, on the side   Worsened by: lay on the left side   Better with: takes ibuprofen before bed   Treatments tried: rest/activity avoidance, ice, heat, and ibuprofen  Associated symptoms: none    Orthopedic/Surgical history: had pain in her hips before and received a back injection.   Social History/Occupation: Nurse Manager      REVIEW OF SYSTEMS:  Do you have fever, chills, weight loss? No  Do you have any vision problems? No  Do you have any chest pain or edema? No  Do you have any shortness of breath or wheezing?  No  Do you have stomach problems? No  Do you have any numbness or focal weakness? Has tingling in her hands due to her neck  Do you have diabetes? No  Do you have problems with bleeding or clotting? No  Do you have an rashes or other skin lesions? No    OBJECTIVE:  There were no vitals taken for this visit.     Exam:  Patient is alert, in no acute distress, pleasant and  conversational.    Gait: Nonantalgic.  Normal heel toe gait      left Hip:  Supine PROM:  Flexion: Approximately 115 , no tenderness.  External rotation: approximately 60 , no tenderness.  Internal rotation: Approximately 30 , no tenderness      Strength Testing:  Hip flexion: 5/5.  Hip adduction: 5/5.  Hip abduction: 4+/5.  With pain      Palpation:  positive  tenderness to palpation over the greater trochanter.  negative tenderness to palpation over psoas  negative tenderness to palpation over ASIS  negative tenderness to palpation over Iliac crest  negative tenderness to palpation along the piriformis.  negative tenderness to palpation of the SI joint    Special Tests:  positive  Haydee's test.   positive  VICENTE's test  negative FADIR's test  negative Scour test  negative Reported pain with resisted hip flexion to opposite shoulder     Neurovascularly intact in bilateral lower extremities      RADIOLOGY:    2 view xrays of left hip performed 4/20/2023 and reviewed independently demonstrating no acute fracture.  No significant DJD. See EMR for formal radiology report.

## 2025-02-10 ENCOUNTER — TELEPHONE (OUTPATIENT)
Dept: ORTHOPEDICS | Facility: CLINIC | Age: 55
End: 2025-02-10

## 2025-02-10 DIAGNOSIS — M25.552 LEFT HIP PAIN: Primary | ICD-10-CM

## 2025-02-10 NOTE — TELEPHONE ENCOUNTER
Order(s): Other:   Reason for requested: [facility] is requesting a PT order for hip for patient   Date needed: 2/10  Provider name:     Could we send this information to you in [employer] or would you prefer to receive a phone call?:   No preference   Okay to leave a detailed message?: No at Other phone number:

## 2025-02-15 ENCOUNTER — THERAPY VISIT (OUTPATIENT)
Dept: PHYSICAL THERAPY | Facility: CLINIC | Age: 55
End: 2025-02-15
Payer: COMMERCIAL

## 2025-02-15 DIAGNOSIS — M25.552 LEFT HIP PAIN: ICD-10-CM

## 2025-02-15 DIAGNOSIS — M25.552 ACUTE HIP PAIN, LEFT: Primary | ICD-10-CM

## 2025-02-15 PROCEDURE — 97161 PT EVAL LOW COMPLEX 20 MIN: CPT | Mod: GP | Performed by: PHYSICAL THERAPIST

## 2025-02-15 PROCEDURE — 97110 THERAPEUTIC EXERCISES: CPT | Mod: GP | Performed by: PHYSICAL THERAPIST

## 2025-02-15 ASSESSMENT — ACTIVITIES OF DAILY LIVING (ADL)
WALKING_INITIALLY: MODERATE DIFFICULTY
GOING_UP_1_FLIGHT_OF_STAIRS: SLIGHT DIFFICULTY
ADL_HIGHEST_POTENTIAL_SCORE: 68
SPORTS_TOTAL_ITEM_SCORE: 0
SITTING FOR 15 MINUTES: SLIGHT DIFFICULTY
ROLLING OVER IN BED: SLIGHT DIFFICULTY
PLEASE_INDICATE_YOR_PRIMARY_REASON_FOR_REFERRAL_TO_THERAPY:: HIP
LIGHT_TO_MODERATE_WORK: SLIGHT DIFFICULTY
SPORTS_COUNT: 9
LANDING: SLIGHT DIFFICULTY
WALKING_APPROXIMATELY_10_MINUTES: NO DIFFICULTY AT ALL
PUTTING ON SOCKS AND SHOES: NO DIFFICULTY AT ALL
STANDING FOR 15 MINUTES: SLIGHT DIFFICULTY
HOW_WOULD_YOU_RATE_YOUR_CURRENT_LEVEL_OF_FUNCTION?: ABNORMAL
LIGHT_TO_MODERATE_WORK: SLIGHT DIFFICULTY
HOW_WOULD_YOU_RATE_YOUR_CURRENT_LEVEL_OF_FUNCTION_DURING_YOUR_USUAL_ACTIVITIES_OF_DAILY_LIVING_FROM_0_TO_100_WITH_100_BEING_YOUR_LEVEL_OF_FUNCTION_PRIOR_TO_YOUR_HIP_PROBLEM_AND_0_BEING_THE_INABILITY_TO_PERFORM_ANY_OF_YOUR_USUAL_DAILY_ACTIVITIES?: 75
CUTTING/LATERAL_MOVEMENTS: SLIGHT DIFFICULTY
ABILITY_TO_PERFORM_ACTIVITY_WITH_YOUR_NORMAL_TECHNIQUE: MODERATE DIFFICULTY
WALKING_FOR_APPROXIMATELY_10_MINUTES: NO DIFFICULTY AT ALL
ADL_SCORE(%): 0
ADL_COUNT: 17
ABILITY_TO_PARTICIPATE_IN_YOUR_DESIRED_SPORT_AS_LONG_AS_YOU_WOULD_LIKE: MODERATE DIFFICULTY
HOW_WOULD_YOU_RATE_YOUR_CURRENT_LEVEL_OF_FUNCTION_DURING_YOUR_SPORTS_RELATED_ACTIVITIES_FROM_0_TO_100_WITH_100_BEING_YOUR_LEVEL_OF_FUNCTION_PRIOR_TO_YOUR_HIP_PROBLEM_AND_0_BEING_THE_INABILITY_TO_PERFORM_ANY_OF_YOUR_USUAL_DAILY_ACTIVITIES?: 50
HOS_ADL_ITEM_SCORE_TOTAL: 49
STANDING_FOR_15_MINUTES: SLIGHT DIFFICULTY
WALKING_INITIALLY: MODERATE DIFFICULTY
HOW_WOULD_YOU_RATE_YOUR_CURRENT_LEVEL_OF_FUNCTION_DURING_YOUR_USUAL_ACTIVITIES_OF_DAILY_LIVING_FROM_0_TO_100_WITH_100_BEING_YOUR_LEVEL_OF_FUNCTION_PRIOR_TO_YOUR_HIP_PROBLEM_AND_0_BEING_THE_INABILITY_TO_PERFORM_ANY_OF_YOUR_USUAL_DAILY_ACTIVITIES?: 75
RECREATIONAL ACTIVITIES: MODERATE DIFFICULTY
WALKING_DOWN_STEEP_HILLS: NO DIFFICULTY AT ALL
HOS_ADL_SCORE(%): 76.56
STARTING_AND_STOPPING_QUICKLY: NO DIFFICULTY AT ALL
JUMPING: EXTREME DIFFICULTY
HOS_ADL_HIGHEST_POTENTIAL_SCORE: 64
GOING UP 1 FLIGHT OF STAIRS: SLIGHT DIFFICULTY
DEEP SQUATTING: SLIGHT DIFFICULTY
SWINGING_OBJECTS_LIKE_A_GOLF_CLUB: SLIGHT DIFFICULTY
SITTING_FOR_15_MINUTES: SLIGHT DIFFICULTY
GOING DOWN 1 FLIGHT OF STAIRS: SLIGHT DIFFICULTY
WALKING_DOWN_STEEP_HILLS: NO DIFFICULTY AT ALL
GOING_DOWN_1_FLIGHT_OF_STAIRS: SLIGHT DIFFICULTY
SPORTS_HIGHEST_POTENTIAL_SCORE: 36
RECREATIONAL_ACTIVITIES: MODERATE DIFFICULTY
WALKING_15_MINUTES_OR_GREATER: NO DIFFICULTY AT ALL
WALKING_UP_STEEP_HILLS: SLIGHT DIFFICULTY
PUTTING_ON_SOCKS_AND_SHOES: NO DIFFICULTY AT ALL
STEPPING_UP_AND_DOWN_CURBS: SLIGHT DIFFICULTY
DEEP_SQUATTING: SLIGHT DIFFICULTY
GETTING INTO AND OUT OF AN AVERAGE CAR: SLIGHT DIFFICULTY
GETTING_INTO_AND_OUT_OF_AN_AVERAGE_CAR: SLIGHT DIFFICULTY
ROLLING_OVER_IN_BED: SLIGHT DIFFICULTY
ADL_TOTAL_ITEM_SCORE: 0
STEPPING UP AND DOWN CURBS: SLIGHT DIFFICULTY
WALKING_UP_STEEP_HILLS: SLIGHT DIFFICULTY
HEAVY_WORK: SLIGHT DIFFICULTY
HEAVY_WORK: SLIGHT DIFFICULTY
RUNNING_ONE_MILE: EXTREME DIFFICULTY
SPORTS_SCORE(%): 0
TWISTING/PIVOTING ON INVOLVED LEG: SLIGHT DIFFICULTY
WALKING_15_MINUTES_OR_GREATER: NO DIFFICULTY AT ALL
LOW_IMPACT_ACTIVITIES_LIKE_FAST_WALKING: SLIGHT DIFFICULTY
TWISTING/PIVOTING_ON_INVOLVED_LEG: SLIGHT DIFFICULTY

## 2025-02-15 NOTE — PROGRESS NOTES
"PHYSICAL THERAPY EVALUATION  Type of Visit: Evaluation        Fall Risk Screen:  Fall screen completed by: PT  Have you fallen 2 or more times in the past year?: No  Have you fallen and had an injury in the past year?: No  Is patient a fall risk?: No    Subjective         Presenting condition or subjective complaint: left hip pain, unable to run, extemely sore when going from sitting to standing, can't sleep on left side, pain to trochanter with palpation  Date of onset: 01/04/25    Relevant medical history:     Dates & types of surgery: hernia repair, appendectomy    Prior diagnostic imaging/testing results: X-ray     Prior therapy history for the same diagnosis, illness or injury: No        Living Environment  Social support: With a significant other or spouse   Type of home: House   Stairs to enter the home:         Ramp: No   Stairs inside the home: No       Help at home: None  Equipment owned:       Employment: Yes Nurse Manager  Hobbies/Interests: exercise, run,walk , golf, read    Patient goals for therapy: sleep, run? get up from sitting without stiffness and pain in the hip    Pain assessment: See objective evaluation for additional pain details     PHYSICAL THERAPY ORTHOPEDIC EVALUATION    SUBJECTIVE:  Jessie is a 54 year old female who reports left lateral hip pain most pronounced with running and laying on her left side. This pain has been going on for a month plus where is came on all of a sudden. She works out regularly with running and lifting but is more limited due to the current pain (not running at all).      Patient reports Red Flags symptoms of:  None      HIP EVALUATION  PAIN:   Associated symptoms: pain  Pain Level at Rest: 0/10  Pain Level with Use: \"moderate pain\"  Pain Location: left hip, lateral, greater trochanter and bursa, gluteals broadly  Pain Quality: Aching and Sharpness  Pain Frequency: intermittent  Pain is Exacerbated By: running, laying on the side, squats and lunges, lots of " walking  Pain is Relieved By: rest  Pain Progression: Unchanged  Related stressors: none relevant      OBJECTIVE    INTEGUMENTARY/APPEARANCE:   WNL (no edema, ecchymosis, erythema or drainage)    GAIT/TRANSFERS (if appropriate):  Level of [place]:   Assistive Device(s):   Gait Deviations:   Gait Distance (self reported maximum distance):  Stairs:  Transfers:     FUNCTIONAL TESTS:  Single Leg Squat: Anterior knee translation, Knee valgus, Hip internal rotation, and Improper use of glutes/hips    RANGE OF MOTION:  Left: WNL but pain at end range IR   Right: WNL  End feel:   Pain:   Joint mobility:    PALPATION:  tenderness to palpation at bursa, glute tendons    STRENGTH:  Hip Flexion: L 5/5, R 5/5  Knee Flexion: L 5/5, R 5/5  Knee Extension: L 5/5, R 5/5  Hip Abduction: L 4/5, R 5/5  Hip Extension: L 5-/5, R 5/5  Quality of quadricep contraction:     OTHER:  Special tests: + Amber and Haydee's         ASSESSMENT:  Jessie is a 54 year old female referred to Physical Therapy for Left hip pain, acute   from Henry Samson.  Jessie demonstrates findings of Pain, Weakness, and Motion Loss that justify a need for formal Physical Therapy. These impairments interfere with their ability to perform recreational activities, household chores, and community mobility as compared to their previous level of function.    Medical Diagnosis: Left hip pain, acute    Treatment Diagnosis: Left hip pain, acute     Clinical Decision Making (Complexity):  Clinical Presentation: Stable/Uncomplicated  Clinical Presentation Rationale: based on medical and personal factors listed in PT evaluation  Clinical Decision Making (Complexity): Low complexity      PHYSICAL THERAPY PLAN OF CARE:  Treatment Interventions:  Modalities: Cryotherapy, E-stim, Hot Pack, Ultrasound  Interventions: Manual Therapy, Neuromuscular Re-education, Therapeutic Activity, Therapeutic Exercise    Long Term Goals     PT Goal 1  Goal Identifier: LTG 1  Goal  Description: Patient will be able to walk for 60 minutes without hip pain and run >20 minutes without maria alejandra  Rationale: to maximize safety and independence with performance of ADLs and functional tasks;to maximize safety and independence with self cares (and to promote healthy lifestyle)  Target Date: 03/29/25    Frequency of Treatment: 1x/week  Duration of Treatment: 6 visits         Risks and benefits of evaluation/treatment have been explained.   Patient/Family/caregiver agrees with Plan of Care.      Evaluation Time:     PT Eval, Low Complexity Minutes (45277): 23       Signing Clinician: Costa Harry PT        SUMMARY OF PLAN OF CARE:  Findings most consistent with gluteal tendinopathy and potential bursitis. PT to focus on gluteal isometric loading and gentle stretching initially.

## 2025-06-26 ENCOUNTER — OFFICE VISIT (OUTPATIENT)
Dept: FAMILY MEDICINE | Facility: CLINIC | Age: 55
End: 2025-06-26
Attending: INTERNAL MEDICINE
Payer: COMMERCIAL

## 2025-06-26 VITALS
HEART RATE: 50 BPM | DIASTOLIC BLOOD PRESSURE: 78 MMHG | WEIGHT: 155.4 LBS | RESPIRATION RATE: 16 BRPM | TEMPERATURE: 97.6 F | OXYGEN SATURATION: 98 % | SYSTOLIC BLOOD PRESSURE: 116 MMHG | BODY MASS INDEX: 24.98 KG/M2 | HEIGHT: 66 IN

## 2025-06-26 DIAGNOSIS — Z00.00 ROUTINE GENERAL MEDICAL EXAMINATION AT A HEALTH CARE FACILITY: Primary | ICD-10-CM

## 2025-06-26 DIAGNOSIS — Z79.890 HORMONE REPLACEMENT THERAPY, POSTMENOPAUSAL: ICD-10-CM

## 2025-06-26 DIAGNOSIS — Z12.31 ENCOUNTER FOR SCREENING MAMMOGRAM FOR MALIGNANT NEOPLASM OF BREAST: ICD-10-CM

## 2025-06-26 DIAGNOSIS — J45.990 EXERCISE-INDUCED ASTHMA: ICD-10-CM

## 2025-06-26 PROCEDURE — 1126F AMNT PAIN NOTED NONE PRSNT: CPT | Performed by: INTERNAL MEDICINE

## 2025-06-26 PROCEDURE — G2211 COMPLEX E/M VISIT ADD ON: HCPCS | Performed by: INTERNAL MEDICINE

## 2025-06-26 PROCEDURE — 3078F DIAST BP <80 MM HG: CPT | Performed by: INTERNAL MEDICINE

## 2025-06-26 PROCEDURE — 99214 OFFICE O/P EST MOD 30 MIN: CPT | Mod: 25 | Performed by: INTERNAL MEDICINE

## 2025-06-26 PROCEDURE — 99396 PREV VISIT EST AGE 40-64: CPT | Performed by: INTERNAL MEDICINE

## 2025-06-26 PROCEDURE — 3074F SYST BP LT 130 MM HG: CPT | Performed by: INTERNAL MEDICINE

## 2025-06-26 RX ORDER — PROGESTERONE 100 MG/1
100 CAPSULE ORAL DAILY
Qty: 30 CAPSULE | Refills: 0 | Status: SHIPPED | OUTPATIENT
Start: 2025-06-26

## 2025-06-26 RX ORDER — ESTRADIOL 0.03 MG/D
1 PATCH TRANSDERMAL WEEKLY
Qty: 4 PATCH | Refills: 0 | Status: SHIPPED | OUTPATIENT
Start: 2025-06-26

## 2025-06-26 RX ORDER — ALBUTEROL SULFATE 90 UG/1
1-2 INHALANT RESPIRATORY (INHALATION) EVERY 6 HOURS PRN
Qty: 18 G | Refills: 3 | Status: SHIPPED | OUTPATIENT
Start: 2025-06-26

## 2025-06-26 SDOH — HEALTH STABILITY: PHYSICAL HEALTH: ON AVERAGE, HOW MANY DAYS PER WEEK DO YOU ENGAGE IN MODERATE TO STRENUOUS EXERCISE (LIKE A BRISK WALK)?: 3 DAYS

## 2025-06-26 ASSESSMENT — ASTHMA QUESTIONNAIRES
QUESTION_5 LAST FOUR WEEKS HOW WOULD YOU RATE YOUR ASTHMA CONTROL: COMPLETELY CONTROLLED
QUESTION_2 LAST FOUR WEEKS HOW OFTEN HAVE YOU HAD SHORTNESS OF BREATH: NOT AT ALL
QUESTION_4 LAST FOUR WEEKS HOW OFTEN HAVE YOU USED YOUR RESCUE INHALER OR NEBULIZER MEDICATION (SUCH AS ALBUTEROL): ONCE A WEEK OR LESS
QUESTION_1 LAST FOUR WEEKS HOW MUCH OF THE TIME DID YOUR ASTHMA KEEP YOU FROM GETTING AS MUCH DONE AT WORK, SCHOOL OR AT HOME: NONE OF THE TIME
QUESTION_3 LAST FOUR WEEKS HOW OFTEN DID YOUR ASTHMA SYMPTOMS (WHEEZING, COUGHING, SHORTNESS OF BREATH, CHEST TIGHTNESS OR PAIN) WAKE YOU UP AT NIGHT OR EARLIER THAN USUAL IN THE MORNING: NOT AT ALL
ACT_TOTALSCORE: 24

## 2025-06-26 ASSESSMENT — PAIN SCALES - GENERAL: PAINLEVEL_OUTOF10: NO PAIN (0)

## 2025-06-26 ASSESSMENT — SOCIAL DETERMINANTS OF HEALTH (SDOH): HOW OFTEN DO YOU GET TOGETHER WITH FRIENDS OR RELATIVES?: ONCE A WEEK

## 2025-06-26 NOTE — PROGRESS NOTES
Preventive Care Visit  [facility]  Kasia Dias MD PhD, Internal Medicine - Pediatrics  Jun 26, 2025      Assessment & Plan      Jessie was seen today for physical.    Diagnoses and all orders for this visit:    Routine general medical examination at a health care facility  -     PRIMARY CARE FOLLOW-UP SCHEDULING  -     REVIEW OF HEALTH MAINTENANCE PROTOCOL ORDERS  -     PRIMARY CARE FOLLOW-UP SCHEDULING; Future  -     MA Screening with Implants Bilateral w/ Jose Manuel; Future    Hormone replacement therapy, postmenopausal  Comments:  Discussed short-term use, potential side effect of increased thromboembolic event and breast cancer.  Patient did knowledge the risks.  Orders:  -     estradiol (FEMPATCH) 0.025 MG/24HR weekly patch; Place 1 patch over 168 hours onto the skin once a week.  -     progesterone (PROMETRIUM) 100 MG capsule; Take 1 capsule (100 mg) by mouth daily.    Exercise-induced asthma  Comments:  Stable, doing well.  Orders:  -     albuterol (VENTOLIN HFA) 108 (90 Base) MCG/ACT inhaler; Inhale 1-2 puffs into the lungs every 6 hours as needed for shortness of breath (before exercise).    Encounter for screening mammogram for malignant neoplasm of breast  -     MA Screening with Implants Bilateral w/ Jose Manuel; Future        Reviewed preventive health counseling, as reflected in patient instructions  Counseling  Appropriate preventive services were addressed with this patient via screening, questionnaire, or discussion as appropriate for fall prevention, nutrition, physical activity, Tobacco-use cessation, social engagement, weight loss and cognition.  Checklist reviewing preventive services available has been given to the patient.  Reviewed patient's diet, addressing concerns and/or questions.   She is at risk for lack of exercise and has been provided with information to increase physical activity for the benefit of her well-being.   She is at risk for psychosocial distress and has been  provided with information to reduce risk.         Follow-up    Follow-up Visit   Expected date:  Jun 26, 2026 (Approximate)      Follow Up Appointment Details:     Follow-up with whom?: PCP    Follow-Up for what?: Adult Preventive    How?: In Person                 Inna Roman is a 54 year old, presenting for the following:  Physical        6/26/2025     4:04 PM   Additional Questions   Roomed by Shade   Accompanied by Self            Jessie Candelario is a 54 year old female who has ASCUS with positive high risk HPV and Exercise-induced asthma on their pertinent problem list.     Overall doing well.  Currently works in management as an RN, looking for different job  Has menopausal symptoms, will like to consider low-dose hormone replacement patch  Postmenopausal, no menses since endometrial ablation in 2017    Advance Care Planning    Discussed advance care planning with patient; informed AVS has link to [facility].        6/26/2025   General Health   How would you rate your overall physical health? Good   Feel stress (tense, anxious, or unable to sleep) Rather much   (!) STRESS CONCERN      6/26/2025   Nutrition   Three or more servings of calcium each day? Yes   Diet: Regular (no restrictions)   How many servings of fruit and vegetables per day? 4 or more   How many sweetened beverages each day? 0-1         6/26/2025   Exercise   Days per week of moderate/strenous exercise 3 days         6/26/2025   Social Factors   Frequency of gathering with friends or relatives Once a week   Worry food won't last until get money to buy more No   Food not last or not have enough money for food? No   Do you have housing? (Housing is defined as stable permanent housing and does not include staying outside in a car, in a tent, in an abandoned building, in an overnight shelter, or couch-surfing.) Yes   Are you worried about losing your housing? No   Lack of transportation? No   Unable to get utilities  (heat,electricity)? No         2025   Fall Risk   Fallen 2 or more times in the past year? No   Trouble with walking or balance? No          2025   Dental   Dentist two times every year? Yes         Today's PHQ-2 Score:       2025     3:27 PM   PHQ-2 (  [employer])   Q1: Little interest or pleasure in doing things 1   Q2: Feeling down, depressed or hopeless 1   PHQ-2 Score 2    Q1: Little interest or pleasure in doing things Several days   Q2: Feeling down, depressed or hopeless Several days   PHQ-2 Score 2       Patient-reported           2025   Substance Use   Alcohol more than 3/day or more than 7/wk No   Do you use any other substances recreationally? No     Social History     Tobacco Use    Smoking status: Former     Current packs/day: 0.00     Types: Cigarettes     Quit date: 1998     Years since quittin.4    Smokeless tobacco: Never   Vaping Use    Vaping status: Never Used   Substance Use Topics    Alcohol use: Yes     Comment: wine 6-7 glasses  per week    Drug use: No           2024   LAST FHS-7 RESULTS   1st degree relative breast or ovarian cancer No   Any relative bilateral breast cancer No   Any male have breast cancer No   Any ONE woman have BOTH breast AND ovarian cancer No   Any woman with breast cancer before 50yrs No   2 or more relatives with breast AND/OR ovarian cancer No   2 or more relatives with breast AND/OR bowel cancer No        Mammogram Screening - Mammogram every 1-2 years updated in Health Maintenance based on mutual decision making        2025   STI Screening   New sexual partner(s) since last STI/HIV test? No     History of abnormal Pap smear: No - age 21 - 65 - Patient with other risk factor requiring more frequent screening - see link Cervical Cytology Screening Guidelines        Latest Ref Rng & Units 2024     1:07 PM 10/22/2020     2:09 PM 10/22/2020     2:04 PM   PAP / HPV   PAP  Atypical squamous cells of undetermined significance  (ASC-US)      PAP (Historical)    NIL    HPV 16 DNA Negative Negative  Negative     HPV 18 DNA Negative Negative  Negative     Other HR HPV Negative Negative  Negative       ASCVD Risk   The 10-year ASCVD risk score (Jazzy RUEDA, et al., 2019) is: 0.8%    Values used to calculate the score:      Age: 54 years      Sex: Female      Is Non- : No      Diabetic: No      Tobacco smoker: No      Systolic Blood Pressure: 116 mmHg      Is BP treated: No      HDL Cholesterol: 87 mg/dL      Total Cholesterol: 175 mg/dL           Reviewed and updated as needed this visit by Provider                    Current Outpatient Medications   Medication Sig Dispense Refill    albuterol (VENTOLIN HFA) 108 (90 Base) MCG/ACT inhaler Inhale 1-2 puffs into the lungs every 6 hours as needed for shortness of breath (before exercise). 18 g 3    Calcium Carb-Cholecalciferol (OYSTER SHELL CALCIUM) 500-400 MG-UNIT TABS Take 1 tablet by mouth daily      co-enzyme Q-10 100 MG CAPS capsule Take 50 mg by mouth daily       estradiol (FEMPATCH) 0.025 MG/24HR weekly patch Place 1 patch over 168 hours onto the skin once a week. 4 patch 0    ibuprofen (ADVIL,MOTRIN) 200 MG tablet Take 800 mg by mouth every 6 hours as needed       meloxicam (MOBIC) 15 MG tablet TAKE 1 TABLET BY MOUTH EVERY DAY 30 tablet 0    Multiple Vitamins-Iron TABS Take 1 tablet by mouth daily      Omega-3 Fatty Acids (FISH OIL) 1200 MG capsule Take 1,200 mg by mouth daily      progesterone (PROMETRIUM) 100 MG capsule Take 1 capsule (100 mg) by mouth daily. 30 capsule 0    tretinoin (RETIN-A) 0.025 % external cream APPLY TOPICALLY TO FACE AT BEDTIME, STARTING EVERY OTHER NIGHT, BUILD UP TO NIGHTLY AS TOLERATED           Review of Systems  Constitutional, HEENT, cardiovascular, pulmonary, gi and gu systems are negative, except as otherwise noted.     Objective    Exam  /78 (BP Location: Right arm, Patient Position: Sitting, Cuff Size: Adult Regular)   " Pulse 50   Temp 97.6  F (36.4  C) (Tympanic)   Resp 16   Ht 1.683 m (5' 6.25\")   Wt 70.5 kg (155 lb 6.4 oz)   SpO2 98%   BMI 24.89 kg/m     Estimated body mass index is 24.89 kg/m  as calculated from the following:    Height as of this encounter: 1.683 m (5' 6.25\").    Weight as of this encounter: 70.5 kg (155 lb 6.4 oz).    Physical Exam  GENERAL: alert and no distress  EYES: Eyes grossly normal to inspection, PERRL and conjunctivae and sclerae normal  HENT: ear canals and TM's normal, nose and mouth without ulcers or lesions  NECK: no adenopathy, no asymmetry, masses, or scars  RESP: lungs clear to auscultation - no rales, rhonchi or wheezes  CV: regular rate and rhythm, normal S1 S2, no S3 or S4, no murmur, click or rub, no peripheral edema  ABDOMEN: soft, nontender, no hepatosplenomegaly, no masses and bowel sounds normal  MS: no gross musculoskeletal defects noted, no edema  SKIN: no suspicious lesions or rashes  NEURO: Normal strength and tone, mentation intact and speech normal  PSYCH: mentation appears normal, affect normal/bright        Signed Electronically by: Kasia Dias MD PhD    "

## 2025-06-26 NOTE — PATIENT INSTRUCTIONS
Patient Education   Preventive Care Advice   This is general advice given by our system to help you stay healthy. However, your care team may have specific advice just for you. Please talk to your care team about your preventive care needs.  Nutrition  Eat 5 or more servings of fruits and vegetables each day.  Try wheat bread, brown rice and whole grain pasta (instead of white bread, rice, and pasta).  Get enough calcium and vitamin D. Check the label on foods and aim for 100% of the RDA (recommended daily allowance).  Lifestyle  Exercise at least 150 minutes each week  (30 minutes a day, 5 days a week).  Do muscle strengthening activities 2 days a week. These help control your weight and prevent disease.  No smoking.  Wear sunscreen to prevent skin cancer.  Have a dental exam and cleaning every 6 months.  Yearly exams  See your health care team every year to talk about:  Any changes in your health.  Any medicines your care team has prescribed.  Preventive care, family planning, and ways to prevent chronic diseases.  Shots (vaccines)   HPV shots (up to age 26), if you've never had them before.  Hepatitis B shots (up to age 59), if you've never had them before.  COVID-19 shot: Get this shot when it's due.  Flu shot: Get a flu shot every year.  Tetanus shot: Get a tetanus shot every 10 years.  Pneumococcal, hepatitis A, and RSV shots: Ask your care team if you need these based on your risk.  Shingles shot (for age 50 and up)  General health tests  Diabetes screening:  Starting at age 35, Get screened for diabetes at least every 3 years.  If you are younger than age 35, ask your care team if you should be screened for diabetes.  Cholesterol test: At age 39, start having a cholesterol test every 5 years, or more often if advised.  Bone density scan (DEXA): At age 50, ask your care team if you should have this scan for osteoporosis (brittle bones).  Hepatitis C: Get tested at least once in your life.  STIs (sexually  transmitted infections)  Before age 24: Ask your care team if you should be screened for STIs.  After age 24: Get screened for STIs if you're at risk. You are at risk for STIs (including HIV) if:  You are sexually active with more than one person.  You don't use condoms every time.  You or a partner was diagnosed with a sexually transmitted infection.  If you are at risk for HIV, ask about PrEP medicine to prevent HIV.  Get tested for HIV at least once in your life, whether you are at risk for HIV or not.  Cancer screening tests  Cervical cancer screening: If you have a cervix, begin getting regular cervical cancer screening tests starting at age 21.  Breast cancer scan (mammogram): If you've ever had breasts, begin having regular mammograms starting at age 40. This is a scan to check for breast cancer.  Colon cancer screening: It is important to start screening for colon cancer at age 45.  Have a colonoscopy test every 10 years (or more often if you're at risk) Or, ask your provider about stool tests like a FIT test every year or Cologuard test every 3 years.  To learn more about your testing options, visit:   .  For help making a decision, visit:   https://bit.ly/ls23407.  Prostate cancer screening test: If you have a prostate, ask your care team if a prostate cancer screening test (PSA) at age 55 is right for you.  Lung cancer screening: If you are a current or former smoker ages 50 to 80, ask your care team if ongoing lung cancer screenings are right for you.  For informational purposes only. Not to replace the advice of your health care provider. Copyright   2023 [facility]. All rights reserved. Clinically reviewed by the [facility]. [employer] [id number] - REV 01/24.  Learning About Stress  What is stress?     Stress is your body's response to a hard situation. Your body can have a physical, emotional, or mental response. Stress is a fact of life for most people, and it  affects everyone differently. What causes stress for you may not be stressful for someone else.  A lot of things can cause stress. You may feel stress when you go on a job interview, take a test, or run a race. This kind of short-term stress is normal and even useful. It can help you if you need to work hard or react quickly. For example, stress can help you finish an important job on time.  Long-term stress is caused by ongoing stressful situations or events. Examples of long-term stress include long-term health problems, ongoing problems at work, or conflicts in your family. Long-term stress can harm your health.  How does stress affect your health?  When you are stressed, your body responds as though you are in danger. It makes hormones that speed up your heart, make you breathe faster, and give you a burst of energy. This is called the fight-or-flight stress response. If the stress is over quickly, your body goes back to normal and no harm is done.  But if stress happens too often or lasts too long, it can have bad effects. Long-term stress can make you more likely to get sick, and it can make symptoms of some diseases worse. If you tense up when you are stressed, you may develop neck, shoulder, or low back pain. Stress is linked to high blood pressure and heart disease.  Stress also harms your emotional health. It can make you mccoy, tense, or depressed. Your relationships may suffer, and you may not do well at work or school.  What can you do to manage stress?  You can try these things to help manage stress:   Do something active. Exercise or activity can help reduce stress. Walking is a great way to get started. Even everyday activities such as housecleaning or yard work can help.  Try yoga or gina chi. These techniques combine exercise and meditation. You may need some training at first to learn them.  Do something you enjoy. For example, listen to music or go to a movie. Practice your hobby or do volunteer  "work.  Meditate. This can help you relax, because you are not worrying about what happened before or what may happen in the future.  Do guided imagery. Imagine yourself in any setting that helps you feel calm. You can use online videos, books, or a teacher to guide you.  Do breathing exercises. For example:  From a standing position, bend forward from the waist with your knees slightly bent. Let your arms dangle close to the floor.  Breathe in slowly and deeply as you return to a standing position. Roll up slowly and lift your head last.  Hold your breath for just a few seconds in the standing position.  Breathe out slowly and bend forward from the waist.  Let your feelings out. Talk, laugh, cry, and express anger when you need to. Talking with supportive friends or family, a counselor, or a johnny leader about your feelings is a healthy way to relieve stress. Avoid discussing your feelings with people who make you feel worse.  Write. It may help to write about things that are bothering you. This helps you find out how much stress you feel and what is causing it. When you know this, you can find better ways to cope.  What can you do to prevent stress?  You might try some of these things to help prevent stress:  Manage your time. This helps you find time to do the things you want and need to do.  Get enough sleep. Your body recovers from the stresses of the day while you are sleeping.  Get support. Your family, friends, and community can make a difference in how you experience stress.  Limit your news feed. Avoid or limit time on social media or news that may make you feel stressed.  Do something active. Exercise or activity can help reduce stress. Walking is a great way to get started.  Where can you learn more?  Go to https://www.[employer].net/patiented  Enter N032 in the search box to learn more about \"Learning About Stress.\"  Current as of: October 24, 2024  Content Version: 14.5 2024-2025 [facility]   Care instructions adapted under license by your healthcare professional. If you have questions about a medical condition or this instruction, always ask your healthcare professional. [employer] disclaims any warranty or liability for your use of this information.

## 2025-07-24 ENCOUNTER — PATIENT OUTREACH (OUTPATIENT)
Dept: CARE COORDINATION | Facility: CLINIC | Age: 55
End: 2025-07-24
Payer: COMMERCIAL

## 2025-07-28 ENCOUNTER — MYC MEDICAL ADVICE (OUTPATIENT)
Dept: FAMILY MEDICINE | Facility: CLINIC | Age: 55
End: 2025-07-28
Payer: COMMERCIAL

## 2025-07-28 DIAGNOSIS — Z79.890 HORMONE REPLACEMENT THERAPY, POSTMENOPAUSAL: ICD-10-CM

## 2025-07-29 RX ORDER — ESTRADIOL 0.03 MG/D
1 PATCH TRANSDERMAL WEEKLY
Qty: 12 PATCH | Refills: 3 | Status: SHIPPED | OUTPATIENT
Start: 2025-07-29

## 2025-07-29 RX ORDER — PROGESTERONE 100 MG/1
100 CAPSULE ORAL DAILY
Qty: 90 CAPSULE | Refills: 3 | Status: SHIPPED | OUTPATIENT
Start: 2025-07-29

## (undated) DEVICE — SU VICRYL 0 TIE 54" J608H

## (undated) DEVICE — DRSG PRIMAPORE 02X3" 7133

## (undated) DEVICE — COVER CAMERA IN-LIGHT DISP LT-C02

## (undated) DEVICE — LIGHT HANDLE X1 31140133

## (undated) DEVICE — ESU LIGASURE MARYLAND LAPAROSCOPIC SLR/DVDR 5MMX37CM LF1937

## (undated) DEVICE — SUCTION MANIFOLD NEPTUNE 2 SYS 4 PORT 0702-020-000

## (undated) DEVICE — GLOVE PROTEXIS BLUE W/NEU-THERA 8.0  2D73EB80

## (undated) DEVICE — DEVICE SUTURE PASSER 14GA WECK EFX EFXSP2

## (undated) DEVICE — PREP CHLORAPREP 26ML TINTED ORANGE  260815

## (undated) DEVICE — ANTIFOG SOLUTION W/FOAM PAD 31142527

## (undated) DEVICE — Device

## (undated) DEVICE — ENDO TROCAR FIRST ENTRY KII FIOS Z-THRD 12X100MM CTF73

## (undated) DEVICE — ENDO POUCH UNIV RETRIEVAL SYSTEM INZII 10MM CD001

## (undated) DEVICE — ENDO TROCAR FIRST ENTRY KII FIOS Z-THRD 05X100MM CTF03

## (undated) DEVICE — SU VICRYL 0 UR-6 27" J603H

## (undated) DEVICE — LINEN TOWEL PACK X5 5464

## (undated) DEVICE — SU VICRYL 4-0 PS-2 18" UND J496H

## (undated) DEVICE — SOL NACL 0.9% INJ 1000ML BAG 2B1324X

## (undated) DEVICE — DRSG STERI STRIP 1/2X4" R1547

## (undated) DEVICE — STPL RELOAD REG TISSUE ECHELON 45 X 3.6MM BLUE GST45B

## (undated) DEVICE — NDL INSUFFLATION 13GA 120MM C2201

## (undated) DEVICE — LINEN TOWEL PACK X6 WHITE 5487

## (undated) DEVICE — ENDO TROCAR SLEEVE KII Z-THREADED 05X100MM CTS02

## (undated) DEVICE — GLOVE PROTEXIS POWDER FREE SMT 7.5  2D72PT75X

## (undated) DEVICE — STPL ENDO ARTICULATING 45MM EC45A

## (undated) DEVICE — SUCTION IRR STRYKERFLOW II W/TIP 250-070-520

## (undated) DEVICE — CATH TRAY FOLEY SURESTEP 16FR W/URNE MTR STLK LATEX A303316A

## (undated) DEVICE — ESU GROUND PAD ADULT W/CORD E7507

## (undated) DEVICE — ESU PENCIL W/COATED BLADE E2450H

## (undated) DEVICE — SOL WATER IRRIG 1000ML BOTTLE 2F7114

## (undated) RX ORDER — LIDOCAINE HYDROCHLORIDE 20 MG/ML
INJECTION, SOLUTION EPIDURAL; INFILTRATION; INTRACAUDAL; PERINEURAL
Status: DISPENSED
Start: 2021-08-27

## (undated) RX ORDER — FENTANYL CITRATE 50 UG/ML
INJECTION, SOLUTION INTRAMUSCULAR; INTRAVENOUS
Status: DISPENSED
Start: 2021-08-27

## (undated) RX ORDER — LIDOCAINE HYDROCHLORIDE 10 MG/ML
INJECTION, SOLUTION EPIDURAL; INFILTRATION; INTRACAUDAL; PERINEURAL
Status: DISPENSED
Start: 2023-07-10

## (undated) RX ORDER — ACETAMINOPHEN 325 MG/1
TABLET ORAL
Status: DISPENSED
Start: 2021-08-27

## (undated) RX ORDER — ONDANSETRON 2 MG/ML
INJECTION INTRAMUSCULAR; INTRAVENOUS
Status: DISPENSED
Start: 2021-08-27

## (undated) RX ORDER — BUPIVACAINE HYDROCHLORIDE 2.5 MG/ML
INJECTION, SOLUTION EPIDURAL; INFILTRATION; INTRACAUDAL
Status: DISPENSED
Start: 2021-08-27

## (undated) RX ORDER — BETAMETHASONE SODIUM PHOSPHATE AND BETAMETHASONE ACETATE 3; 3 MG/ML; MG/ML
INJECTION, SUSPENSION INTRA-ARTICULAR; INTRALESIONAL; INTRAMUSCULAR; SOFT TISSUE
Status: DISPENSED
Start: 2023-07-10

## (undated) RX ORDER — SCOLOPAMINE TRANSDERMAL SYSTEM 1 MG/1
PATCH, EXTENDED RELEASE TRANSDERMAL
Status: DISPENSED
Start: 2021-08-27

## (undated) RX ORDER — EPHEDRINE SULFATE 50 MG/ML
INJECTION, SOLUTION INTRAMUSCULAR; INTRAVENOUS; SUBCUTANEOUS
Status: DISPENSED
Start: 2021-08-27

## (undated) RX ORDER — HYDROMORPHONE HYDROCHLORIDE 1 MG/ML
INJECTION, SOLUTION INTRAMUSCULAR; INTRAVENOUS; SUBCUTANEOUS
Status: DISPENSED
Start: 2021-08-27

## (undated) RX ORDER — PROPOFOL 10 MG/ML
INJECTION, EMULSION INTRAVENOUS
Status: DISPENSED
Start: 2021-08-27

## (undated) RX ORDER — DEXAMETHASONE SODIUM PHOSPHATE 4 MG/ML
INJECTION, SOLUTION INTRA-ARTICULAR; INTRALESIONAL; INTRAMUSCULAR; INTRAVENOUS; SOFT TISSUE
Status: DISPENSED
Start: 2021-08-27

## (undated) RX ORDER — FENTANYL CITRATE-0.9 % NACL/PF 10 MCG/ML
PLASTIC BAG, INJECTION (ML) INTRAVENOUS
Status: DISPENSED
Start: 2021-08-27